# Patient Record
Sex: MALE | Race: WHITE | Employment: OTHER | URBAN - METROPOLITAN AREA
[De-identification: names, ages, dates, MRNs, and addresses within clinical notes are randomized per-mention and may not be internally consistent; named-entity substitution may affect disease eponyms.]

---

## 2020-03-30 ENCOUNTER — HOSPITAL ENCOUNTER (EMERGENCY)
Age: 75
Discharge: HOME | End: 2020-03-30
Attending: EMERGENCY MEDICINE
Payer: MEDICARE

## 2020-03-30 ENCOUNTER — APPOINTMENT (OUTPATIENT)
Dept: CT IMAGING | Age: 75
End: 2020-03-30
Payer: MEDICARE

## 2020-03-30 ENCOUNTER — APPOINTMENT (OUTPATIENT)
Dept: GENERAL RADIOLOGY | Age: 75
End: 2020-03-30
Payer: MEDICARE

## 2020-03-30 VITALS
TEMPERATURE: 97.3 F | HEART RATE: 88 BPM | HEIGHT: 72 IN | OXYGEN SATURATION: 97 % | SYSTOLIC BLOOD PRESSURE: 161 MMHG | RESPIRATION RATE: 15 BRPM | WEIGHT: 210 LBS | DIASTOLIC BLOOD PRESSURE: 80 MMHG | BODY MASS INDEX: 28.44 KG/M2

## 2020-03-30 LAB
ALBUMIN SERPL-MCNC: 4.1 GM/DL (ref 3.4–5)
ALP BLD-CCNC: 70 IU/L (ref 40–129)
ALT SERPL-CCNC: 13 U/L (ref 10–40)
ANION GAP SERPL CALCULATED.3IONS-SCNC: 17 MMOL/L (ref 4–16)
AST SERPL-CCNC: 20 IU/L (ref 15–37)
BASOPHILS ABSOLUTE: 0 K/CU MM
BASOPHILS RELATIVE PERCENT: 0.4 % (ref 0–1)
BILIRUB SERPL-MCNC: 0.4 MG/DL (ref 0–1)
BUN BLDV-MCNC: 21 MG/DL (ref 6–23)
CALCIUM SERPL-MCNC: 9.9 MG/DL (ref 8.3–10.6)
CHLORIDE BLD-SCNC: 103 MMOL/L (ref 99–110)
CO2: 21 MMOL/L (ref 21–32)
CREAT SERPL-MCNC: 1.5 MG/DL (ref 0.9–1.3)
DIFFERENTIAL TYPE: ABNORMAL
EOSINOPHILS ABSOLUTE: 0.3 K/CU MM
EOSINOPHILS RELATIVE PERCENT: 2.8 % (ref 0–3)
GFR AFRICAN AMERICAN: 56 ML/MIN/1.73M2
GFR NON-AFRICAN AMERICAN: 46 ML/MIN/1.73M2
GLUCOSE BLD-MCNC: 116 MG/DL (ref 70–99)
HCT VFR BLD CALC: 46.9 % (ref 42–52)
HEMOGLOBIN: 15.8 GM/DL (ref 13.5–18)
IMMATURE NEUTROPHIL %: 0.1 % (ref 0–0.43)
LYMPHOCYTES ABSOLUTE: 3.8 K/CU MM
LYMPHOCYTES RELATIVE PERCENT: 41.4 % (ref 24–44)
MCH RBC QN AUTO: 31.3 PG (ref 27–31)
MCHC RBC AUTO-ENTMCNC: 33.7 % (ref 32–36)
MCV RBC AUTO: 93.1 FL (ref 78–100)
MONOCYTES ABSOLUTE: 0.9 K/CU MM
MONOCYTES RELATIVE PERCENT: 9.4 % (ref 0–4)
PDW BLD-RTO: 13.2 % (ref 11.7–14.9)
PLATELET # BLD: 227 K/CU MM (ref 140–440)
PMV BLD AUTO: 10.8 FL (ref 7.5–11.1)
POTASSIUM SERPL-SCNC: 4.4 MMOL/L (ref 3.5–5.1)
RBC # BLD: 5.04 M/CU MM (ref 4.6–6.2)
SEGMENTED NEUTROPHILS ABSOLUTE COUNT: 4.2 K/CU MM
SEGMENTED NEUTROPHILS RELATIVE PERCENT: 45.9 % (ref 36–66)
SODIUM BLD-SCNC: 141 MMOL/L (ref 135–145)
TOTAL IMMATURE NEUTOROPHIL: 0.01 K/CU MM
TOTAL PROTEIN: 7.2 GM/DL (ref 6.4–8.2)
TROPONIN T: <0.01 NG/ML
WBC # BLD: 9.1 K/CU MM (ref 4–10.5)

## 2020-03-30 PROCEDURE — 93005 ELECTROCARDIOGRAM TRACING: CPT | Performed by: EMERGENCY MEDICINE

## 2020-03-30 PROCEDURE — 80053 COMPREHEN METABOLIC PANEL: CPT

## 2020-03-30 PROCEDURE — 99284 EMERGENCY DEPT VISIT MOD MDM: CPT

## 2020-03-30 PROCEDURE — 84484 ASSAY OF TROPONIN QUANT: CPT

## 2020-03-30 PROCEDURE — 85025 COMPLETE CBC W/AUTO DIFF WBC: CPT

## 2020-03-30 PROCEDURE — 70450 CT HEAD/BRAIN W/O DYE: CPT

## 2020-03-30 PROCEDURE — 6370000000 HC RX 637 (ALT 250 FOR IP): Performed by: EMERGENCY MEDICINE

## 2020-03-30 PROCEDURE — 71046 X-RAY EXAM CHEST 2 VIEWS: CPT

## 2020-03-30 RX ORDER — MECLIZINE HCL 12.5 MG/1
50 TABLET ORAL ONCE
Status: COMPLETED | OUTPATIENT
Start: 2020-03-30 | End: 2020-03-30

## 2020-03-30 RX ORDER — MECLIZINE HYDROCHLORIDE 25 MG/1
25 TABLET ORAL 3 TIMES DAILY PRN
Qty: 15 TABLET | Refills: 0 | Status: SHIPPED | OUTPATIENT
Start: 2020-03-30 | End: 2020-04-09

## 2020-03-30 RX ORDER — ONDANSETRON 4 MG/1
4 TABLET, FILM COATED ORAL 3 TIMES DAILY PRN
Qty: 15 TABLET | Refills: 0 | Status: SHIPPED | OUTPATIENT
Start: 2020-03-30 | End: 2022-02-17

## 2020-03-30 RX ADMIN — MECLIZINE 50 MG: 12.5 TABLET ORAL at 16:00

## 2020-03-30 NOTE — ED PROVIDER NOTES
Triage Chief Complaint:   Dizziness      [place]:  Marcy Pedraza is a 70 y.o. male that presents for:    Chief complaint:  Dizzy    Location:  Generalized    Quality/Characteristic:  Feels light headed at times but also feels some rotational vertiginous component as well. Duration:  Intermittent over the past week, not worse today however he was unable to get in touch with his PCP, he believes the office is closed because of the pandemic. Aggravating/Alleviating factors:  He is specifically wondering what his glucose is and if he is diabetic, this was a concern of a friend/family member and so he came in for labs. No prior episodes. No new meds. No head trauma. No prior brain imaging. Associated symptoms:  Denies fever, visual changes, neck pain/stiffness, tinnitus, hearing loss, sore throat, congestion, chest pain, sob, pnd, orthopnea, abdominal pain, vomiting, diarrhea, urinary complaints. Denies adamantly no headache, difficulty with speech, visual changes, cognitive difficulty, weakness, numbness, tingling. No gait disturbance leading to fall or imbalance. Severity:  No pain, feels symptoms \"aren't that bad so I didn't want to come in I just wanted to make sure it wasn't diabetes. \"    ROS:    Constitutional: No fevers/chills. No weight changes. No night sweats. Eyes:  No blurry vision or double vision. No drainage. Ears, Nose, Throat:  No hearing changes. No rhinitis. No sore throat or cough. Cardiac: No chest pain or pressure. No arm/jaw pain. No palpitations but does endorse lightheadedness. No claudication symptoms. Respiratory:  No dyspnea. No hemoptysis. GI: No abdominal pain. No n/v/d. No hematochezia or melena. :  No hematuria. No dysuria. No discharge. MSK:  No joint pain or swelling. No lower extremity swelling. Skin:  No rashes. No pruritis. Neuro:  No numbness, tingling or weakness in any extremity or face. No headache.  +dizzy as above.   No 0=No drift, limb holds 90 (or 45) degrees for full 10 seconds  7. Limb Ataxia:    0=Absent  8. Sensory:          0=Normal; no sensory loss  9. Best Language:           0=No aphasia, normal  10. Dysarthria:      0=Normal  11. Extinction and Inattention:      0=No abnormality  12.        Distal motor function:  0=Normal              Total:  0      I have reviewed and interpreted all of the currently available lab results from this visit (if applicable):  Results for orders placed or performed during the hospital encounter of 03/30/20   CBC Auto Differential   Result Value Ref Range    WBC 9.1 4.0 - 10.5 K/CU MM    RBC 5.04 4.6 - 6.2 M/CU MM    Hemoglobin 15.8 13.5 - 18.0 GM/DL    Hematocrit 46.9 42 - 52 %    MCV 93.1 78 - 100 FL    MCH 31.3 (H) 27 - 31 PG    MCHC 33.7 32.0 - 36.0 %    RDW 13.2 11.7 - 14.9 %    Platelets 642 713 - 871 K/CU MM    MPV 10.8 7.5 - 11.1 FL    Differential Type AUTOMATED DIFFERENTIAL     Segs Relative 45.9 36 - 66 %    Lymphocytes % 41.4 24 - 44 %    Monocytes % 9.4 (H) 0 - 4 %    Eosinophils % 2.8 0 - 3 %    Basophils % 0.4 0 - 1 %    Segs Absolute 4.2 K/CU MM    Lymphocytes Absolute 3.8 K/CU MM    Monocytes Absolute 0.9 K/CU MM    Eosinophils Absolute 0.3 K/CU MM    Basophils Absolute 0.0 K/CU MM    Immature Neutrophil % 0.1 0 - 0.43 %    Total Immature Neutrophil 0.01 K/CU MM   Comprehensive Metabolic Panel w/ Reflex to MG   Result Value Ref Range    Sodium 141 135 - 145 MMOL/L    Potassium 4.4 3.5 - 5.1 MMOL/L    Chloride 103 99 - 110 mMol/L    CO2 21 21 - 32 MMOL/L    BUN 21 6 - 23 MG/DL    CREATININE 1.5 (H) 0.9 - 1.3 MG/DL    Glucose 116 (H) 70 - 99 MG/DL    Calcium 9.9 8.3 - 10.6 MG/DL    Alb 4.1 3.4 - 5.0 GM/DL    Total Protein 7.2 6.4 - 8.2 GM/DL    Total Bilirubin 0.4 0.0 - 1.0 MG/DL    ALT 13 10 - 40 U/L    AST 20 15 - 37 IU/L    Alkaline Phosphatase 70 40 - 129 IU/L    GFR Non- 46 (L) >60 mL/min/1.73m2    GFR  56 cause of his symptoms which if left undiagnosed could lead to serious disability or death. Discussion included but not limited to heart attack, stroke, blood clots and kidney failure. After multiple discussions the patient refused. He prefers to follow up on an outpatient basis and does understand that he will forego emergent workup via admission for further imaging such as MRI, ultrasounds, angiography. The patient consulted with his friend, a nurse in our health system, and does feel that he will refuse. Given these constraints I have counseled him to return to ER if worse in any way. Final Impression:  1. Dizziness        Discharge referral (if applicable): MD Kimi [address]  [phone]    Schedule an appointment as soon as possible for a visit   Schedule an appointment to begin primary care if you do not have a primary care physician, this is the on-call primary doctor of the day. Alfreda Santos MD  [phone]    Schedule an appointment as soon as possible for a visit   Call to schedule an appointment with neurology for further evaluation of dizziness, and to discuss outpatient work-up including  MRI of the brain.       Discharge medications (if applicable):  Discharge Medication List as of 3/30/2020  5:12 PM      START taking these medications    Details   ondansetron (ZOFRAN) 4 MG tablet Take 1 tablet by mouth 3 times daily as needed for Nausea (dizziness), Disp-15 tablet, R-0Print      meclizine (ANTIVERT) 25 MG tablet Take 1 tablet by mouth 3 times daily as needed for Dizziness or Nausea, Disp-15 tablet, R-0Print             (Please note that portions of this note may have been completed with a voice recognition program. Efforts were made to edit the dictations but occasionally words are mis-transcribed.)    Ruben Lara MD          CRITICAL CARE TIME: 41 minutes of critical care time was provided to

## 2020-03-30 NOTE — ED TRIAGE NOTES
Pt reports having dizziness on for a week or so pt thought he was eating too much sugar and cut back states he is trying to get into the dr but unable to get an appointment.  Pt is alert and oriented and in no distress

## 2020-03-31 PROCEDURE — 93010 ELECTROCARDIOGRAM REPORT: CPT | Performed by: INTERNAL MEDICINE

## 2020-04-01 LAB
EKG ATRIAL RATE: 77 BPM
EKG DIAGNOSIS: NORMAL
EKG P AXIS: 36 DEGREES
EKG P-R INTERVAL: 116 MS
EKG Q-T INTERVAL: 374 MS
EKG QRS DURATION: 80 MS
EKG QTC CALCULATION (BAZETT): 423 MS
EKG R AXIS: 5 DEGREES
EKG T AXIS: 35 DEGREES
EKG VENTRICULAR RATE: 77 BPM

## 2020-09-18 ENCOUNTER — HOSPITAL ENCOUNTER (OUTPATIENT)
Dept: MRI IMAGING | Age: 75
Discharge: HOME | End: 2020-09-18
Payer: MEDICARE

## 2020-09-18 PROCEDURE — 70551 MRI BRAIN STEM W/O DYE: CPT

## 2021-10-25 ENCOUNTER — OFFICE VISIT (OUTPATIENT)
Dept: NEUROLOGY | Age: 76
End: 2021-10-25
Payer: MEDICARE

## 2021-10-25 DIAGNOSIS — G62.9 NEUROPATHY: Primary | ICD-10-CM

## 2021-10-25 PROCEDURE — 95910 NRV CNDJ TEST 7-8 STUDIES: CPT | Performed by: PHYSICAL MEDICINE & REHABILITATION

## 2021-10-25 PROCEDURE — 95886 MUSC TEST DONE W/N TEST COMP: CPT | Performed by: PHYSICAL MEDICINE & REHABILITATION

## 2021-10-25 NOTE — PROGRESS NOTES
EMG    Risks and benefits of study discussed. Specific and common risks of pain and bleeding as well as uncommon side effects of infection, hematoma and vasovagal episodes    Patient agreeable to testing and consents to such. Clinical: 6 months of ataxia and some paresthesias of the feet    Motor NCS:  Tibial amplitudes are mildly depressed with normal latencies and mildly slow velocity. Peroneal amplitudes are mild to moderately depressed; latency on the right is normal left is mildly slow; velocities are mildly slow bilateral    Sensory NCS:  Sural and peroneal responses are absent bilateral    Needle EMG:  Mild enlargement in the distal ankle segments    Impression:  #1 mild, chronic, axonal peripheral neuropathy, length dependent affecting the lower limbs. # 2 no convincing evidence of a focal or proximal lesion such as radiculopathy, plexopathy or mononeuropathy.

## 2022-02-17 ENCOUNTER — HOSPITAL ENCOUNTER (OUTPATIENT)
Age: 77
Discharge: HOME | End: 2022-02-17
Payer: MEDICARE

## 2022-02-17 ENCOUNTER — HOSPITAL ENCOUNTER (OUTPATIENT)
Dept: GENERAL RADIOLOGY | Age: 77
Discharge: HOME | End: 2022-02-17
Payer: MEDICARE

## 2022-02-17 DIAGNOSIS — Z01.818 ENCOUNTER FOR PREADMISSION TESTING: ICD-10-CM

## 2022-02-17 PROCEDURE — 71046 X-RAY EXAM CHEST 2 VIEWS: CPT

## 2022-02-17 NOTE — PROGRESS NOTES
2/17/22 1149 2/17/22 Patient is very confused on where his surgery is. I explained to him that surgery is at [facility] on 2/21/22. He told me someone called him and told him that his surgery is at the surgery center. He was upset and yelling  and told me to call his wife Bandar Cuevas at [phone].      2/17/22 1154  I spoke to Bandar Faith and she completed the PAT assessment for the patient and  took the pre-op instructions. [place] Faith was informed that Shanae Vaqsuez has to have a CXR done at [facility] either today, 2/17/22 or 2/18/22. Shanae Vasquez is to arrive anytime before 1400 to complete CXR. Bandar Cuevas told me that patient had his covid test done at the [facility]. 2/17/22 1233 I spoke to Niltonadonay  at [facility] and she confirmed that  The patient did have a covid test done on 2/14/22 and it is negative. She is faxing the results to me. ADDENDUM 2/17/22 1335 Received Covid test results. 2/17/22 1241 I called Dr. Solomon harper and was transferred to the voicemail of surgery scheduler Alonzo Contreras. I informed her that when I called the patient he was upset and confused because he said that someone called him and told him his surgery was at the surgery center. He is referring to [facility] surgery center. Patient got his Covid test done there. He refused to talk to me and told me to call his wife. I ask that Alonzo Contreras please reach out to [facility] to let them know that Steve's surgery is here at [facility] on 2/21/22.    2/17/22 1247  I spoke to Bandar Cuevas and let [facility] know [facility] is faxing me a copy ogf the negative covid test results from 2/14/22. Also, I let her know that I called Dr. Solomon harper and left a voicemail for Alonzo Contreras, the surgery schedule to call Bandar Cuevas and Shanae Vasquez to resolve any confusion. Bandar Cuevas thanked me for everything and said Shanae Vasquez is on his way to [facility] to have the CXR done.

## 2022-02-17 NOTE — PROGRESS NOTES
2/17/22 1044 (cell) No voicemail box set up, so unable to leave a voicemail. 2/17/22 1048 (home phone) [facility] answered the phone and said this phone has a bad connection. She said she will have Beto Villegas call me back on his cell phone. 2/17/22 1149 I spoke to patient and he is confused about location and Covid test. He told me to call his wife [facility] again and talk to her.    2/17/22 1154 I spoke to [facility] and she completed the phone PAT assessment for Beto Villegas. She took the pre-op instructions. She was informed that Beto Villegas must have a CXR done either today or Friday, 2/18/22 done at [address] [place] [address]. She confirmed that Beto Villegas did a Covid test at [facility]. 2/17/22 1234 I spoke to Betsygen 91 at [facility] and she confirmed patient had a covid test on 2/14/22 and it is negative. She is faxing me the results. Surgery is at [address] [place] [address] on 2/21/22. You will be called on 2/18/22  with times. 1. Do not eat or drink anything after midnight - unless instructed by your doctor prior to surgery. This includes no water, chewing gum or mints. 2. Follow your directions as prescribed by the doctor for your procedure and medications. 3. Check with your Doctor regarding stopping vitamins, supplements, blood thinners (Plavix, Coumadin, Lovenox, Effient, Pradaxa, Xarelto, Fragmin or other blood thinners) and follow their instructions. Stop all supplements and herbals 7 days prior to surgery. Morning of surgery, do not take any medications. 4. Do not smoke, and do not drink any alcoholic beverages 24 hours prior to surgery. This includes NA Beer. 5. You may brush your teeth and gargle the morning of surgery. DO NOT SWALLOW WATER   6. You MUST make arrangements for a responsible adult to take you home after your surgery and be able to check on you every couple hours for the day. You will not be allowed to leave alone or drive yourself home. It is strongly suggested someone stay with you the first 24  hrs.  Your surgery will be cancelled if you do not have a ride home. 7. Please wear simple, loose fitting clothing to the hospital.  Gina Jacques not bring valuables (money, credit cards, checkbooks, etc.) Do not wear any makeup (including no eye makeup) or nail polish on your fingers or toes. 8. DO NOT wear any jewelry or piercings on day of surgery. All body piercing jewelry must be removed. 9. If you have dentures, they will be removed before going to the OR; we will provide you a container. If you wear contact lenses or glasses,  they will be removed; please bring a case for them. 10. If you  have a Living Will and Durable Power of  for Healthcare, please bring in a copy. 11. Please bring picture ID,  insurance card, paperwork from the doctors office    (H & P, Consent, & card for implantable devices). 12. Take a shower the night before or morning of your procedure, do not apply any lotion, oil or powder. Use the provided CHG wash during pre-op shower/bath. 13. Wear a mask covering your nose & mouth when entering the hospital. Have your covid-19 test performed within 2-7 days of your surgery. Quarantine yourself after the test until after your surgery. Murali 32 2/14/22 = negative.

## 2022-02-18 NOTE — PROGRESS NOTES
I confirmed with patient's wife, Svetlana Hills  that surgery is at [facility] on 2/21/22 at [address] with an arrival time of 1015.

## 2022-02-21 ENCOUNTER — HOSPITAL ENCOUNTER (OUTPATIENT)
Age: 77
Setting detail: OUTPATIENT SURGERY
Discharge: HOME | End: 2022-02-21
Attending: UROLOGY | Admitting: UROLOGY
Payer: MEDICARE

## 2022-02-21 ENCOUNTER — ANESTHESIA (OUTPATIENT)
Dept: OPERATING ROOM | Age: 77
End: 2022-02-21
Payer: MEDICARE

## 2022-02-21 ENCOUNTER — ANESTHESIA EVENT (OUTPATIENT)
Dept: OPERATING ROOM | Age: 77
End: 2022-02-21
Payer: MEDICARE

## 2022-02-21 VITALS
SYSTOLIC BLOOD PRESSURE: 130 MMHG | OXYGEN SATURATION: 95 % | TEMPERATURE: 97.2 F | HEART RATE: 70 BPM | RESPIRATION RATE: 18 BRPM | BODY MASS INDEX: 25.73 KG/M2 | WEIGHT: 190 LBS | HEIGHT: 72 IN | DIASTOLIC BLOOD PRESSURE: 65 MMHG

## 2022-02-21 VITALS
TEMPERATURE: 98.6 F | DIASTOLIC BLOOD PRESSURE: 69 MMHG | RESPIRATION RATE: 10 BRPM | OXYGEN SATURATION: 95 % | SYSTOLIC BLOOD PRESSURE: 115 MMHG

## 2022-02-21 DIAGNOSIS — Z01.818 ENCOUNTER FOR PREADMISSION TESTING: Primary | ICD-10-CM

## 2022-02-21 DIAGNOSIS — N40.1 BENIGN PROSTATIC HYPERPLASIA WITH LOWER URINARY TRACT SYMPTOMS, SYMPTOM DETAILS UNSPECIFIED: ICD-10-CM

## 2022-02-21 DIAGNOSIS — N13.8 ENLARGED PROSTATE WITH URINARY OBSTRUCTION: ICD-10-CM

## 2022-02-21 DIAGNOSIS — N40.1 ENLARGED PROSTATE WITH URINARY OBSTRUCTION: ICD-10-CM

## 2022-02-21 LAB
ANION GAP SERPL CALCULATED.3IONS-SCNC: 9 MMOL/L (ref 4–16)
BUN BLDV-MCNC: 20 MG/DL (ref 6–23)
CALCIUM SERPL-MCNC: 11.1 MG/DL (ref 8.3–10.6)
CHLORIDE BLD-SCNC: 102 MMOL/L (ref 99–110)
CO2: 26 MMOL/L (ref 21–32)
CREAT SERPL-MCNC: 2.3 MG/DL (ref 0.9–1.3)
GFR AFRICAN AMERICAN: 34 ML/MIN/1.73M2
GFR NON-AFRICAN AMERICAN: 28 ML/MIN/1.73M2
GLUCOSE BLD-MCNC: 121 MG/DL (ref 70–99)
POTASSIUM SERPL-SCNC: 4.4 MMOL/L (ref 3.5–5.1)
SODIUM BLD-SCNC: 137 MMOL/L (ref 135–145)

## 2022-02-21 PROCEDURE — 7100000010 HC PHASE II RECOVERY - FIRST 15 MIN: Performed by: UROLOGY

## 2022-02-21 PROCEDURE — 2580000003 HC RX 258: Performed by: UROLOGY

## 2022-02-21 PROCEDURE — 7100000011 HC PHASE II RECOVERY - ADDTL 15 MIN: Performed by: UROLOGY

## 2022-02-21 PROCEDURE — 6360000002 HC RX W HCPCS: Performed by: UROLOGY

## 2022-02-21 PROCEDURE — 7100000001 HC PACU RECOVERY - ADDTL 15 MIN: Performed by: UROLOGY

## 2022-02-21 PROCEDURE — 88305 TISSUE EXAM BY PATHOLOGIST: CPT

## 2022-02-21 PROCEDURE — 3700000000 HC ANESTHESIA ATTENDED CARE: Performed by: UROLOGY

## 2022-02-21 PROCEDURE — 2500000003 HC RX 250 WO HCPCS: Performed by: NURSE ANESTHETIST, CERTIFIED REGISTERED

## 2022-02-21 PROCEDURE — 6360000002 HC RX W HCPCS: Performed by: NURSE ANESTHETIST, CERTIFIED REGISTERED

## 2022-02-21 PROCEDURE — 3600000013 HC SURGERY LEVEL 3 ADDTL 15MIN: Performed by: UROLOGY

## 2022-02-21 PROCEDURE — 2709999900 HC NON-CHARGEABLE SUPPLY: Performed by: UROLOGY

## 2022-02-21 PROCEDURE — 3600000003 HC SURGERY LEVEL 3 BASE: Performed by: UROLOGY

## 2022-02-21 PROCEDURE — 3700000001 HC ADD 15 MINUTES (ANESTHESIA): Performed by: UROLOGY

## 2022-02-21 PROCEDURE — 80048 BASIC METABOLIC PNL TOTAL CA: CPT

## 2022-02-21 PROCEDURE — 7100000000 HC PACU RECOVERY - FIRST 15 MIN: Performed by: UROLOGY

## 2022-02-21 RX ORDER — SODIUM CHLORIDE 0.9 % (FLUSH) 0.9 %
5-40 SYRINGE (ML) INJECTION EVERY 12 HOURS SCHEDULED
Status: DISCONTINUED | OUTPATIENT
Start: 2022-02-21 | End: 2022-02-21 | Stop reason: HOSPADM

## 2022-02-21 RX ORDER — SODIUM CHLORIDE, SODIUM LACTATE, POTASSIUM CHLORIDE, CALCIUM CHLORIDE 600; 310; 30; 20 MG/100ML; MG/100ML; MG/100ML; MG/100ML
INJECTION, SOLUTION INTRAVENOUS ONCE
Status: COMPLETED | OUTPATIENT
Start: 2022-02-21 | End: 2022-02-21

## 2022-02-21 RX ORDER — DEXAMETHASONE SODIUM PHOSPHATE 4 MG/ML
INJECTION, SOLUTION INTRA-ARTICULAR; INTRALESIONAL; INTRAMUSCULAR; INTRAVENOUS; SOFT TISSUE PRN
Status: DISCONTINUED | OUTPATIENT
Start: 2022-02-21 | End: 2022-02-21 | Stop reason: SDUPTHER

## 2022-02-21 RX ORDER — METOCLOPRAMIDE HYDROCHLORIDE 5 MG/ML
10 INJECTION INTRAMUSCULAR; INTRAVENOUS
Status: DISCONTINUED | OUTPATIENT
Start: 2022-02-21 | End: 2022-02-21 | Stop reason: HOSPADM

## 2022-02-21 RX ORDER — SODIUM CHLORIDE 0.9 % (FLUSH) 0.9 %
5-40 SYRINGE (ML) INJECTION PRN
Status: DISCONTINUED | OUTPATIENT
Start: 2022-02-21 | End: 2022-02-21 | Stop reason: HOSPADM

## 2022-02-21 RX ORDER — FENTANYL CITRATE 50 UG/ML
INJECTION, SOLUTION INTRAMUSCULAR; INTRAVENOUS PRN
Status: DISCONTINUED | OUTPATIENT
Start: 2022-02-21 | End: 2022-02-21 | Stop reason: SDUPTHER

## 2022-02-21 RX ORDER — HYDROCODONE BITARTRATE AND ACETAMINOPHEN 5; 325 MG/1; MG/1
1 TABLET ORAL EVERY 8 HOURS PRN
Qty: 15 TABLET | Refills: 0 | Status: SHIPPED | OUTPATIENT
Start: 2022-02-21 | End: 2022-02-26

## 2022-02-21 RX ORDER — HYDROMORPHONE HCL 110MG/55ML
0.5 PATIENT CONTROLLED ANALGESIA SYRINGE INTRAVENOUS EVERY 5 MIN PRN
Status: DISCONTINUED | OUTPATIENT
Start: 2022-02-21 | End: 2022-02-21 | Stop reason: HOSPADM

## 2022-02-21 RX ORDER — SODIUM CHLORIDE 9 MG/ML
25 INJECTION, SOLUTION INTRAVENOUS PRN
Status: DISCONTINUED | OUTPATIENT
Start: 2022-02-21 | End: 2022-02-21 | Stop reason: HOSPADM

## 2022-02-21 RX ORDER — SULFAMETHOXAZOLE AND TRIMETHOPRIM 400; 80 MG/1; MG/1
1 TABLET ORAL 2 TIMES DAILY
COMMUNITY
End: 2022-08-15

## 2022-02-21 RX ORDER — LIDOCAINE HYDROCHLORIDE 20 MG/ML
INJECTION, SOLUTION EPIDURAL; INFILTRATION; INTRACAUDAL; PERINEURAL PRN
Status: DISCONTINUED | OUTPATIENT
Start: 2022-02-21 | End: 2022-02-21 | Stop reason: SDUPTHER

## 2022-02-21 RX ORDER — CIPROFLOXACIN 2 MG/ML
INJECTION, SOLUTION INTRAVENOUS PRN
Status: DISCONTINUED | OUTPATIENT
Start: 2022-02-21 | End: 2022-02-21 | Stop reason: SDUPTHER

## 2022-02-21 RX ORDER — ONDANSETRON 2 MG/ML
4 INJECTION INTRAMUSCULAR; INTRAVENOUS
Status: DISCONTINUED | OUTPATIENT
Start: 2022-02-21 | End: 2022-02-21 | Stop reason: HOSPADM

## 2022-02-21 RX ORDER — FENTANYL CITRATE 50 UG/ML
25 INJECTION, SOLUTION INTRAMUSCULAR; INTRAVENOUS EVERY 5 MIN PRN
Status: DISCONTINUED | OUTPATIENT
Start: 2022-02-21 | End: 2022-02-21 | Stop reason: HOSPADM

## 2022-02-21 RX ORDER — CIPROFLOXACIN 2 MG/ML
400 INJECTION, SOLUTION INTRAVENOUS ONCE
Status: CANCELLED | OUTPATIENT
Start: 2022-02-21

## 2022-02-21 RX ORDER — ONDANSETRON 2 MG/ML
INJECTION INTRAMUSCULAR; INTRAVENOUS PRN
Status: DISCONTINUED | OUTPATIENT
Start: 2022-02-21 | End: 2022-02-21 | Stop reason: SDUPTHER

## 2022-02-21 RX ORDER — PHENYLEPHRINE HCL IN 0.9% NACL 1 MG/10 ML
SYRINGE (ML) INTRAVENOUS PRN
Status: DISCONTINUED | OUTPATIENT
Start: 2022-02-21 | End: 2022-02-21 | Stop reason: SDUPTHER

## 2022-02-21 RX ORDER — ONDANSETRON 2 MG/ML
INJECTION INTRAMUSCULAR; INTRAVENOUS PRN
Status: DISCONTINUED | OUTPATIENT
Start: 2022-02-21 | End: 2022-02-21

## 2022-02-21 RX ORDER — CIPROFLOXACIN 2 MG/ML
400 INJECTION, SOLUTION INTRAVENOUS EVERY 12 HOURS
Status: CANCELLED | OUTPATIENT
Start: 2022-02-21

## 2022-02-21 RX ORDER — PROPOFOL 10 MG/ML
INJECTION, EMULSION INTRAVENOUS PRN
Status: DISCONTINUED | OUTPATIENT
Start: 2022-02-21 | End: 2022-02-21 | Stop reason: SDUPTHER

## 2022-02-21 RX ADMIN — PROPOFOL 130 MG: 10 INJECTION, EMULSION INTRAVENOUS at 13:55

## 2022-02-21 RX ADMIN — DEXAMETHASONE SODIUM PHOSPHATE 8 MG: 4 INJECTION, SOLUTION INTRAMUSCULAR; INTRAVENOUS at 14:01

## 2022-02-21 RX ADMIN — CIPROFLOXACIN 400 MG: 2 INJECTION, SOLUTION INTRAVENOUS at 14:02

## 2022-02-21 RX ADMIN — SODIUM CHLORIDE, POTASSIUM CHLORIDE, SODIUM LACTATE AND CALCIUM CHLORIDE: 600; 310; 30; 20 INJECTION, SOLUTION INTRAVENOUS at 11:07

## 2022-02-21 RX ADMIN — LIDOCAINE HYDROCHLORIDE 100 MG: 20 INJECTION, SOLUTION EPIDURAL; INFILTRATION; INTRACAUDAL at 13:55

## 2022-02-21 RX ADMIN — Medication 0.1 MG: at 14:33

## 2022-02-21 RX ADMIN — FENTANYL CITRATE 50 MCG: 50 INJECTION, SOLUTION INTRAMUSCULAR; INTRAVENOUS at 14:04

## 2022-02-21 RX ADMIN — CEFAZOLIN 2000 MG: 10 INJECTION, POWDER, FOR SOLUTION INTRAVENOUS at 13:58

## 2022-02-21 RX ADMIN — ONDANSETRON 4 MG: 2 INJECTION INTRAMUSCULAR; INTRAVENOUS at 14:02

## 2022-02-21 RX ADMIN — FENTANYL CITRATE 50 MCG: 50 INJECTION, SOLUTION INTRAMUSCULAR; INTRAVENOUS at 13:55

## 2022-02-21 ASSESSMENT — PULMONARY FUNCTION TESTS
PIF_VALUE: 11
PIF_VALUE: 1
PIF_VALUE: 11
PIF_VALUE: 11
PIF_VALUE: 9
PIF_VALUE: 14
PIF_VALUE: 11
PIF_VALUE: 2
PIF_VALUE: 1
PIF_VALUE: 10
PIF_VALUE: 11
PIF_VALUE: 1
PIF_VALUE: 14
PIF_VALUE: 10
PIF_VALUE: 11
PIF_VALUE: 3
PIF_VALUE: 11
PIF_VALUE: 1
PIF_VALUE: 11
PIF_VALUE: 12
PIF_VALUE: 9
PIF_VALUE: 11
PIF_VALUE: 13
PIF_VALUE: 12
PIF_VALUE: 17
PIF_VALUE: 11
PIF_VALUE: 9
PIF_VALUE: 11
PIF_VALUE: 10
PIF_VALUE: 12
PIF_VALUE: 11
PIF_VALUE: 11
PIF_VALUE: 4
PIF_VALUE: 2
PIF_VALUE: 11
PIF_VALUE: 11
PIF_VALUE: 6
PIF_VALUE: 3
PIF_VALUE: 10
PIF_VALUE: 10
PIF_VALUE: 12
PIF_VALUE: 1
PIF_VALUE: 12
PIF_VALUE: 11
PIF_VALUE: 11
PIF_VALUE: 12
PIF_VALUE: 11
PIF_VALUE: 10
PIF_VALUE: 11
PIF_VALUE: 10
PIF_VALUE: 10
PIF_VALUE: 2
PIF_VALUE: 4
PIF_VALUE: 13
PIF_VALUE: 17
PIF_VALUE: 0
PIF_VALUE: 11
PIF_VALUE: 18
PIF_VALUE: 11
PIF_VALUE: 3
PIF_VALUE: 12
PIF_VALUE: 11
PIF_VALUE: 10
PIF_VALUE: 11
PIF_VALUE: 4

## 2022-02-21 ASSESSMENT — PAIN SCALES - GENERAL
PAINLEVEL_OUTOF10: 0

## 2022-02-21 NOTE — PROGRESS NOTES
[phone] Arrived to [facility], monitors placed and alarms on. Report received from 6 [facility] Rn/Jonathan WALKER. 1520 Dr. Pedrito Greene at bedside  1530 Stat BMP drawn per order and sent to lab. Tolerating ice chips denies any nausea or pain. 1615 Transported to [facility] room 22. Report given to Via Charlie Ward at bedside.

## 2022-02-21 NOTE — H&P
Department of Urology   H&P  [facility] 1 2 3 4 5      Date: 2022   Patient: Pat Khan   : 1945   DOA: 2022   MRN: [id number]   ROOM#: OR/NONE     CHIEF COMPLAINT:  BPH with LUTS                                       Urinary Retention                                       Bladder dysfunction    History Obtained From:  patient    HISTORY OF PRESENT ILLNESS:                The patient is a 78 y.o. male with significant past medical history of chronic BPH with LUTS and current urinary retention with loyola catheter in place. Failed medical therapy. Failed voiding trials. IPSS score > 20. Loyola in place with clear yellow urine. On Bactrim for pre-op urine PCR. On Flomax and Proscar. I reviewed risks, benefits, and alternative therapies. He elected to proceed with TURP. We discussed side affects and failure rates. He declined a SP tube at this time. Discussed expected post op course and loyola replacement. History of elevated PSA with MRI Prostate 2021- no suspicious prostate lesions. Past Medical History:    History reviewed. No pertinent past medical history. Past Surgical History:        Procedure Laterality Date    APPENDECTOMY      HERNIA REPAIR       Current Medications:   proscar  Bactrim  flomax      Allergies:  Patient has no known allergies. Social History:   TOBACCO:   reports that he has never smoked. He has never used smokeless tobacco.  ETOH:   reports previous alcohol use. DRUGS:   reports no history of drug use. MARITAL STATUS:    OCCUPATION:      Family History:         Problem Relation Age of Onset    No Known Problems Mother     No Known Problems Father        REVIEW OF SYSTEMS:    PHYSICAL EXAM:    VITALS:  BP (!) 144/75   Pulse 89   Temp 97.4 °F (36.3 °C) (Temporal)   Resp 16   Ht 6' (1.829 m)   Wt 190 lb (86.2 kg)   SpO2 95%   BMI 25.77 kg/m²     TEMPERATURE:  Current - Temp: 97.4 °F (36.3 °C);  Max - Temp  Av.4 °F (36.3 °C)  Min: 97.4 °F (36.3 °C)  Max: 97.4 °F (36.3 °C)  24HR BLOOD PRESSURE RANGE:  Systolic (23XAO), CFP:785 , Min:144 , VCW:021   ; Diastolic (97FTD), HPR:10, Min:75, Max:75    8HR INTAKE OUTPUT:  No intake/output data recorded. URINARY CATHETER OUTPUT (Loyola):     DRAIN/TUBE OUTPUT:        CONSTITUTIONAL:  awake, alert, cooperative, no apparent distress, and appears stated age  CTA B  RRR  Soft, nd/nt, benign  circ phallus  Loyola in place with clear yellow urine    Data:    WBC:    Lab Results   Component Value Date    WBC 9.1 03/30/2020     Hemoglobin/Hematocrit:    Lab Results   Component Value Date    HGB 15.8 03/30/2020    HCT 46.9 03/30/2020     BMP:    Lab Results   Component Value Date     03/30/2020    K 4.4 03/30/2020     03/30/2020    CO2 21 03/30/2020    BUN 21 03/30/2020    LABALBU 4.1 03/30/2020    CREATININE 1.5 03/30/2020    CALCIUM 9.9 03/30/2020    GFRAA 56 03/30/2020    LABGLOM 46 03/30/2020     PT/INR:  No results found for: PROTIME, INR        Imaging: [unfilled]          Impression:   BPH with LUTS  Urinary retention with loyola catheter in place  (prior > 1000ml of urine in bladder)  History of bladder dysfunction      Recommendation:   Proceed with TURP  Replace loyola after surgery  Ancef and Cipro for procedure      Patient seen and examined, chart reviewed.      Electronically signed by Navi Chan MD on 2/21/2022 at 12:52 PM

## 2022-02-21 NOTE — PROGRESS NOTES
1620 Pt received from PACU and report received from [facility]. Pt denies c/o. Pt has loyola catheter. Pt given Doris mist. Call light in reach. Loyola catheter to gravity draining pink tinged urine. CAll light in reach. Wife at bedside. 1645 In to check on pt. Pt readty to get dressed to go home. Pt given leg bag and instructed pt and wife on loyola cath care. 1650 Discharge instructions given to pt and wife. Both verbalized understanding of instructions. Call light in reach. 1 [facility]. Gallo Bradford in room to talk to pt. [phone] Pt discharged to home per wheelchair to wife's vehicle to drive him home.

## 2022-02-21 NOTE — BRIEF OP NOTE
Brief Postoperative Note      Patient: Jonnathan Gan  YOB: 1945  MRN: [id number]    Date of Procedure: 2/21/2022    Pre-Op Diagnosis: Enlarged prostate with urinary obstruction [N40.1, N13.8]                                Urinary retention    Post-Op Diagnosis: Same       Procedure(s):  CYSTOSCOPY TRANSURETHRAL RESECTION PROSTATE    Surgeon(s):  Triston Ca MD    Assistant:      Anesthesia: General    Estimated Blood Loss (mL): 52JE    Complications: None    Specimens:   ID Type Source Tests Collected by Time Destination   A : PIECES OF PROSTATE Tissue Prostate SURGICAL PATHOLOGY Triston Ca MD 2/21/2022 1415        Implants:        Drains: 22 Am[address] 3 way urinary catheter with catheter plug      Findings:   1. Resection time less than one hour  2. Landmarks veru to bladder neck  3. Both UO's seen and unharmed during procedure  4. Grade I bladder trabeculation  5. Prostate 45 grams on GABRIEL w/o nodules  6. Irritated prostate tissue from right lateral lobe  7.   No visible bleeding around catheter and irrigation clear at end of procedure    Electronically signed by Triston Ca MD on 2/21/2022 at 3:07 PM

## 2022-02-21 NOTE — ANESTHESIA PRE PROCEDURE
Department of Anesthesiology  Preprocedure Note       Name:  Hannah Curran   Age:  78 y.o.  :  1945                                          MRN:  [id number]         Date:  2022      Surgeon: Richard Lowe):  Isaías Seo MD    Procedure: Procedure(s):  CYSTOSCOPY TRANSURETHRAL RESECTION PROSTATE    Medications prior to admission:   Prior to Admission medications    Not on File       Current medications:    Current Facility-Administered Medications   Medication Dose Route Frequency Provider Last Rate Last Admin    lactated ringers infusion   IntraVENous Once Isaías Seo MD        ceFAZolin (ANCEF) 2000 mg in dextrose 5 % 100 mL IVPB  2,000 mg IntraVENous Once Isaías Seo MD           Allergies:  No Known Allergies    Problem List:  There is no problem list on file for this patient. Past Medical History:  History reviewed. No pertinent past medical history. Past Surgical History:        Procedure Laterality Date    APPENDECTOMY      HERNIA REPAIR         Social History:    Social History     Tobacco Use    Smoking status: Never Smoker    Smokeless tobacco: Never Used   Substance Use Topics    Alcohol use: Not Currently                                Counseling given: Not Answered      Vital Signs (Current):   Vitals:    22 1158 22 1045   BP:  (!) 144/75   Pulse:  89   Resp:  16   TempSrc:  Temporal   SpO2:  95%   Weight: 190 lb (86.2 kg)    Height: 6' (1.829 m)                                               BP Readings from Last 3 Encounters:   22 (!) 144/75   20 (!) 161/80       NPO Status:                                                                                 BMI:   Wt Readings from Last 3 Encounters:   22 190 lb (86.2 kg)   20 210 lb (95.3 kg)     Body mass index is 25.77 kg/m².     CBC:   Lab Results   Component Value Date    WBC 9.1 2020    RBC 5.04 2020    HGB 15.8 2020    HCT 46.9 2020    MCV 93.1 2020 RDW 13.2 03/30/2020     03/30/2020       CMP:   Lab Results   Component Value Date     03/30/2020    K 4.4 03/30/2020     03/30/2020    CO2 21 03/30/2020    BUN 21 03/30/2020    CREATININE 1.5 03/30/2020    GFRAA 56 03/30/2020    LABGLOM 46 03/30/2020    GLUCOSE 116 03/30/2020    PROT 7.2 03/30/2020    CALCIUM 9.9 03/30/2020    BILITOT 0.4 03/30/2020    ALKPHOS 70 03/30/2020    AST 20 03/30/2020    ALT 13 03/30/2020       POC Tests: No results for input(s): POCGLU, POCNA, POCK, POCCL, POCBUN, POCHEMO, POCHCT in the last 72 hours. Coags: No results found for: PROTIME, INR, APTT    HCG (If Applicable): No results found for: PREGTESTUR, PREGSERUM, HCG, HCGQUANT     ABGs: No results found for: PHART, PO2ART, TSO6JSE, NBE4LPR, BEART, F8KEUKVU     Type & Screen (If Applicable):  No results found for: LABABO, LABRH    Drug/Infectious Status (If Applicable):  No results found for: HIV, HEPCAB    COVID-19 Screening (If Applicable): No results found for: COVID19        Anesthesia Evaluation  Patient summary reviewed and Nursing notes reviewed  Airway: Mallampati: II  TM distance: >3 FB   Neck ROM: limited  Mouth opening: > = 3 FB Dental: normal exam         Pulmonary:Negative Pulmonary ROS and normal exam              Patient did not smoke on day of surgery. Cardiovascular:  Exercise tolerance: good (>4 METS),         NYHA Classification: I                 Beta Blocker:  Not on Beta Blocker         Neuro/Psych:   Negative Neuro/Psych ROS              GI/Hepatic/Renal:            ROS comment: BPH. Endo/Other: Negative Endo/Other ROS                    Abdominal:             Vascular: negative vascular ROS. Other Findings:           Anesthesia Plan      general     ASA 2       Induction: intravenous. Anesthetic plan and risks discussed with patient and spouse.                       Nyla Juares MD   2/21/2022

## 2022-02-21 NOTE — ANESTHESIA POSTPROCEDURE EVALUATION
Department of Anesthesiology  Postprocedure Note    Patient: Char Lundberg  MRN: [id number]  [place]: 1945  Date of evaluation: 2/21/2022  Time:  3:21 PM     Procedure Summary     Date: 02/21/22 Room / Location: [place] [address] / [facility]    Anesthesia Start: 7566 Anesthesia Stop: 1520    Procedure: CYSTOSCOPY TRANSURETHRAL RESECTION PROSTATE (N/A Bladder) Diagnosis:       Enlarged prostate with urinary obstruction      (Enlarged prostate with urinary obstruction [N40.1, N13.8])    Surgeons: Titus Bonilla MD Responsible Provider: Carlton Lyons MD    Anesthesia Type: general ASA Status: 2          Anesthesia Type: general    Wander Phase I: Wander Score: 10    Wander Phase II:      Last vitals: Reviewed and per EMR flowsheets.        Anesthesia Post Evaluation    Patient location during evaluation: PACU  Patient participation: complete - patient participated  Level of consciousness: awake and alert  Pain score: 0  Airway patency: patent  Nausea & Vomiting: no nausea and no vomiting  Complications: no  Cardiovascular status: blood pressure returned to baseline  Respiratory status: acceptable, spontaneous ventilation and face mask

## 2022-02-22 NOTE — OP NOTE
[address] [place] [address], [address] [place] [address]                                OPERATIVE REPORT    PATIENT NAME: Vernon Huggins                        :        1945  MED REC NO:   [id number]                          ROOM:  ACCOUNT NO:   [de-identified]                           ADMIT DATE: 2022  PROVIDER:     Bridgett Brown MD    DATE OF PROCEDURE:  2022    PREOPERATIVE DIAGNOSES:  1.  Enlarged prostate with urinary obstruction, slow stream, incomplete  bladder emptying. 2.  Urinary retention. POSTOPERATIVE DIAGNOSES:  1.  Enlarged prostate with urinary obstruction, slow stream, incomplete  bladder emptying. 2.  Urinary retention. PROCEDURES PERFORMED:  1. Cystoscopy and transurethral resection of prostate. 2.  Urethral dilation. SURGEON:  Bridgett Brown MD    ANESTHESIA:  General.    ESTIMATED BLOOD LOSS:  25 mL. COMPLICATIONS:  None. SPECIMEN:  Prostatic chips. DRAINS PLACED:  A 22-Czech three-way urinary catheter with catheter  plugs. FINDINGS:  1. Resection time less than 1 hour. 2.  Surgical landmarks with veru to bladder neck. 3.  Both UO's seen and unharmed during procedure. 4.  Grade 1 bladder trabeculation. 5.  Prostate 45 gm on GABRIEL without nodules. 6.  Irritated-appearing prostatic tissue, especially from right lateral  lobe. 7.  No visible bleeding around catheter and irrigation was clear at the  end of procedure. 8.  Urethral dilation of the meatus to 28-Czech using sounds in a  serial fashion. DISPOSITION:  Stable to PACU. INDICATIONS FOR PROCEDURE:  The patient is a 30-year-old male with a  history of enlarged prostate with lower urinary tract symptoms. The  patient had urinary retention requiring an indwelling urinary catheter. The patient's AUA score was greater than 20, and he had failed medical  therapy. He has had BPH symptoms greater than 2 years.   I reviewed the  risks, benefits, and alternatives of the therapies. The patient elected  to proceed with surgery. We discussed the expected postoperative  management. OPERATIVE REPORT:  The patient received preoperative antibiotics of  Ancef and Cipro. He had compression boots in place. He was brought to  the operating room, and placed on the operating room table. A general  anesthetic was administered by the Anesthesia Service. His previous  Day catheter was removed. The patient was placed in dorsal lithotomy  position and prepped and draped in a sterile fashion after being  appropriately padded. Time-out was performed per protocol. A 25-Faroese resectoscope with a 30-degree lens was placed through the  urethra and into the bladder using a visual obturator. The patient had  a median lobe with intravesical prostatic portions. He had grade 1  bladder trabeculation. There is no concerning masses or lesions found  in the bladder. The bladder was irrigated several times. Using a  24-Faroese loop, the prostate was resected. Prior to placement of a  25-Faroese resectoscope, urethral dilation of the meatus was performed  using sounds in a serial fashion to 28-Faroese. The surgical landmarks  of resection were the veru and the bladder neck. Both UO's were seen  and unharmed during this procedure. Resection time was less than 1  hour. He had grade 1 bladder trabeculation. The prostate was resected  again using a 24-Faroese loop. The prostatic chips were irrigated out. Following resection, there is a much improved appearance of patency of  the prostatic urethra. The prostatic chips were irrigated out. Both  UO's were seen and unharmed. The prostatic fossa was fulgurated using  electrocautery. Hemostasis was maintained. The resectoscope was then  removed. A 22-Faroese three-way catheter was placed into the bladder,  and the balloon was inflated. Catheter plug was placed in the  irrigation port.   The catheter irrigated well and was clear. There was  no bleeding around the catheter. A digital rectal examination was  performed, which demonstrated a 45 gm prostate without nodules. Upon  leaving the operating room, the irrigation was clear, and there was no  bleeding around the catheter. The patient tolerated the procedure well  and was brought to the PACU in stable condition. The patient will follow up in our office in approximately 1 to 2 weeks  for a voiding trial.  He will continue the Day catheter at discharge.         Alanis Choudhury MD    D: 02/21/2022 15:17:50       T: 02/21/2022 15:20:31     [email or web address]  Job#: [id number]     Doc#: [id number]    CC:

## 2022-08-15 PROBLEM — E83.52 HYPERCALCEMIA: Status: ACTIVE | Noted: 2022-08-15

## 2022-08-15 PROBLEM — R35.0 BENIGN PROSTATIC HYPERPLASIA WITH URINARY FREQUENCY: Status: ACTIVE | Noted: 2022-08-15

## 2022-08-15 PROBLEM — N18.32 STAGE 3B CHRONIC KIDNEY DISEASE (HCC): Status: ACTIVE | Noted: 2022-08-15

## 2022-08-15 PROBLEM — R42 VERTIGO: Status: ACTIVE | Noted: 2022-08-15

## 2022-08-15 PROBLEM — N40.1 BENIGN PROSTATIC HYPERPLASIA WITH URINARY FREQUENCY: Status: ACTIVE | Noted: 2022-08-15

## 2022-09-29 PROBLEM — N18.31 STAGE 3A CHRONIC KIDNEY DISEASE (HCC): Status: ACTIVE | Noted: 2022-09-29

## 2022-10-03 ENCOUNTER — HOSPITAL ENCOUNTER (EMERGENCY)
Age: 77
Discharge: ANOTHER HOSPITAL | End: 2022-10-04
Attending: EMERGENCY MEDICINE
Payer: MEDICARE

## 2022-10-03 ENCOUNTER — APPOINTMENT (OUTPATIENT)
Dept: CT IMAGING | Age: 77
End: 2022-10-03
Payer: MEDICARE

## 2022-10-03 DIAGNOSIS — R51.9 ACUTE NONINTRACTABLE HEADACHE, UNSPECIFIED HEADACHE TYPE: ICD-10-CM

## 2022-10-03 DIAGNOSIS — H53.9 VISION CHANGES: ICD-10-CM

## 2022-10-03 DIAGNOSIS — I61.9 INTRAPARENCHYMAL HEMORRHAGE OF BRAIN (HCC): Primary | ICD-10-CM

## 2022-10-03 LAB
APTT: 34.1 SECONDS (ref 25.1–37.1)
BASOPHILS ABSOLUTE: 0.1 K/CU MM
BASOPHILS RELATIVE PERCENT: 0.4 % (ref 0–1)
DIFFERENTIAL TYPE: ABNORMAL
EOSINOPHILS ABSOLUTE: 0.5 K/CU MM
EOSINOPHILS RELATIVE PERCENT: 3.8 % (ref 0–3)
GLUCOSE BLD-MCNC: 138 MG/DL (ref 70–99)
HCT VFR BLD CALC: 41.7 % (ref 42–52)
HEMOGLOBIN: 14.2 GM/DL (ref 13.5–18)
IMMATURE NEUTROPHIL %: 0.3 % (ref 0–0.43)
INR BLD: 0.92 INDEX
LYMPHOCYTES ABSOLUTE: 3.6 K/CU MM
LYMPHOCYTES RELATIVE PERCENT: 28.5 % (ref 24–44)
MCH RBC QN AUTO: 30.7 PG (ref 27–31)
MCHC RBC AUTO-ENTMCNC: 34.1 % (ref 32–36)
MCV RBC AUTO: 90.1 FL (ref 78–100)
MONOCYTES ABSOLUTE: 1.1 K/CU MM
MONOCYTES RELATIVE PERCENT: 8.9 % (ref 0–4)
NUCLEATED RBC %: 0 %
PDW BLD-RTO: 13.2 % (ref 11.7–14.9)
PLATELET # BLD: 256 K/CU MM (ref 140–440)
PMV BLD AUTO: 10 FL (ref 7.5–11.1)
PROTHROMBIN TIME: 11.9 SECONDS (ref 11.7–14.5)
RBC # BLD: 4.63 M/CU MM (ref 4.6–6.2)
SEGMENTED NEUTROPHILS ABSOLUTE COUNT: 7.4 K/CU MM
SEGMENTED NEUTROPHILS RELATIVE PERCENT: 58.1 % (ref 36–66)
TOTAL IMMATURE NEUTOROPHIL: 0.04 K/CU MM
TOTAL NUCLEATED RBC: 0 K/CU MM
WBC # BLD: 12.8 K/CU MM (ref 4–10.5)

## 2022-10-03 PROCEDURE — 70450 CT HEAD/BRAIN W/O DYE: CPT

## 2022-10-03 PROCEDURE — 80048 BASIC METABOLIC PNL TOTAL CA: CPT

## 2022-10-03 PROCEDURE — 85610 PROTHROMBIN TIME: CPT

## 2022-10-03 PROCEDURE — 85025 COMPLETE CBC W/AUTO DIFF WBC: CPT

## 2022-10-03 PROCEDURE — 99285 EMERGENCY DEPT VISIT HI MDM: CPT

## 2022-10-03 PROCEDURE — 85730 THROMBOPLASTIN TIME PARTIAL: CPT

## 2022-10-03 PROCEDURE — 84484 ASSAY OF TROPONIN QUANT: CPT

## 2022-10-03 PROCEDURE — 82962 GLUCOSE BLOOD TEST: CPT

## 2022-10-03 PROCEDURE — G0480 DRUG TEST DEF 1-7 CLASSES: HCPCS

## 2022-10-04 VITALS
HEART RATE: 58 BPM | RESPIRATION RATE: 20 BRPM | SYSTOLIC BLOOD PRESSURE: 140 MMHG | DIASTOLIC BLOOD PRESSURE: 62 MMHG | OXYGEN SATURATION: 92 % | TEMPERATURE: 98.5 F

## 2022-10-04 LAB
ALCOHOL SCREEN SERUM: <0.01 %WT/VOL
ANION GAP SERPL CALCULATED.3IONS-SCNC: 12 MMOL/L (ref 4–16)
BUN BLDV-MCNC: 15 MG/DL (ref 6–23)
CALCIUM SERPL-MCNC: 9.5 MG/DL (ref 8.3–10.6)
CHLORIDE BLD-SCNC: 102 MMOL/L (ref 99–110)
CO2: 21 MMOL/L (ref 21–32)
CREAT SERPL-MCNC: 1.3 MG/DL (ref 0.9–1.3)
GFR AFRICAN AMERICAN: >60 ML/MIN/1.73M2
GFR NON-AFRICAN AMERICAN: 54 ML/MIN/1.73M2
GLUCOSE BLD-MCNC: 156 MG/DL (ref 70–99)
POTASSIUM SERPL-SCNC: 3.8 MMOL/L (ref 3.5–5.1)
SODIUM BLD-SCNC: 135 MMOL/L (ref 135–145)
TROPONIN T: <0.01 NG/ML

## 2022-10-04 PROCEDURE — 6360000002 HC RX W HCPCS: Performed by: EMERGENCY MEDICINE

## 2022-10-04 PROCEDURE — 96374 THER/PROPH/DIAG INJ IV PUSH: CPT

## 2022-10-04 PROCEDURE — 2580000003 HC RX 258: Performed by: EMERGENCY MEDICINE

## 2022-10-04 RX ADMIN — LEVETIRACETAM 1000 MG: 100 INJECTION, SOLUTION INTRAVENOUS at 01:10

## 2022-10-04 NOTE — ED PROVIDER NOTES
CHIEF COMPLAINT    Chief Complaint   Patient presents with    Diplopia    Headache     HPI  Gage Feliz is a 78 y.o. male who presents to the ED via EMS with concerns for stroke like symptoms. Patient began to have headache and vision changes that started at 9:30 pm.  Patient was at home with his wife this evening when he began to complain of having difficulty seeing the clock and states that he had loss of vision to the bilateral temporal fields with associated headache. The headache is located particular in the occipital region but radiates anteriorly. The headache is rated as moderate in severity. Nothing makes it better or worse. EMS was called as the patient began to demonstrate some confusion. He arrives here complaining of vision changes and headache. Symptoms are constant. He denies fevers, chills, chest pain, nausea, vomiting, dizziness, lightheadedness. REVIEW OF SYSTEMS  Constitutional: No fever, chills or recent illness. Eye: Complains of vision changes  HENT: No earache or sore throat. Resp: No SOB or productive cough. Cardio: No chest pain or palpitations. GI: No abdominal pain, nausea, vomiting, constipation or diarrhea. No melena. : No dysuria, urgency or frequency. Endocrine: No heat intolerance, no cold intolerance, no polydipsia   Lymphatics: No adenopathy  Musculoskeletal: No new muscle aches or joint pain. Neuro: Complains of headache  Psych: No homicidal or suicidal thoughts  Skin: No rash, No itching. ?  ?   PAST MEDICAL HISTORY  Past Medical History:   Diagnosis Date    UTI (urinary tract infection)      FAMILY HISTORY  Family History   Problem Relation Age of Onset    No Known Problems Mother     No Known Problems Father     Cancer Maternal Grandfather      SOCIAL HISTORY  Social History     Socioeconomic History    Marital status:    Tobacco Use    Smoking status: Never    Smokeless tobacco: Never   Vaping Use    Vaping Use: Never used   Substance and Sexual Activity    Alcohol use: Not Currently    Drug use: Never    Sexual activity: Not Currently     Partners: Female       SURGICAL HISTORY  Past Surgical History:   Procedure Laterality Date    APPENDECTOMY      HERNIA REPAIR      TURP N/A 2/21/2022    CYSTOSCOPY TRANSURETHRAL RESECTION PROSTATE performed by Elena Thomas MD at [facility]  Previous Medications    ASPIRIN 81 MG EC TABLET    Take 81 mg by mouth in the morning. MULTIPLE VITAMINS-MINERALS (MULTIVITAMIN MEN 50+ PO)    Take 1 tablet by mouth daily     ALLERGIES  No Known Allergies    Nursing notes reviewed by myself for past medical history, family history, social history, surgical history, current medications, and allergies. PHYSICAL EXAM  VITAL SIGNS: Triage VS:    ED Triage Vitals   Enc Vitals Group      BP       Pulse       Resp       Temp       Temp src       SpO2       Weight       Height       Head Circumference       Peak Flow       Pain Score       Pain Loc       Pain Edu? Excl. in [address]? Constitutional: Well developed, Well nourished, nontoxic appearing  HENT: Normocephalic, Atraumatic, Bilateral external ears normal, Oropharynx moist, No oral exudates, Nose normal.   Eyes: PERRL, EOMI, Conjunctiva normal, No discharge. No scleral icterus. Neck: Normal range of motion, No tenderness, Supple. Lymphatic: No lymphadenopathy noted. Cardiovascular: Normal heart rate, Normal rhythm, No murmurs, gallops or rubs. Thorax & Lungs: Normal breath sounds, No respiratory distress, No wheezing. Abdomen: Soft, No tenderness, No masses, No pulsatile masses, No distention, Normal bowel sounds  Skin: Warm, Dry, Pink, No mottling, No erythema, No rash. Back: No tenderness, No CVA tenderness. Extremities: No edema, No tenderness, No cyanosis, Normal perfusion, No clubbing. Musculoskeletal: Good range of motion in all major joints as observed. No major deformities noted.    Neurologic: Alert & oriented to person and place but not time, patient is repetitive with his speech and confused, GCS 14 secondary to mild confusion, normal motor function, Normal sensory function, CN II-XII grossly intact as tested, No focal deficits noted. When examined the patient he demonstrates evidence of bilateral temporal hemianopsia  Psychiatric: Confused, cooperative  EKG  [unfilled]  RADIOLOGY  Labs Reviewed   CBC WITH AUTO DIFFERENTIAL - Abnormal; Notable for the following components:       Result Value    WBC 12.8 (*)     Hematocrit 41.7 (*)     Monocytes % 8.9 (*)     Eosinophils % 3.8 (*)     All other components within normal limits   BASIC METABOLIC PANEL - Abnormal; Notable for the following components:    Glucose 156 (*)     GFR Non- 54 (*)     All other components within normal limits   POCT GLUCOSE - Abnormal; Notable for the following components:    POC Glucose 138 (*)     All other components within normal limits   TROPONIN   PROTIME-INR   APTT   ETHANOL   URINALYSIS     I personally reviewed the images. The radiologist's interpretation reveals:  Last Imaging results   CT HEAD WO CONTRAST   Final Result   Acute intraparenchymal hemorrhage in the posterior left temporal lobe   measuring 4 x 2.5 cm. No significant midline shift. Extensive old ischemic changes. Critical results were called by Dr. Filippo Coronado to Dr. Haley Winslow On 10/3/2022   at 23:44. CTA HEAD NECK W CONTRAST    (Results Pending)       MEDS GIVEN IN ED:  Medications   levETIRAcetam (KEPPRA) 1,000 mg in sodium chloride 0.9 % 100 mL IVPB (has no administration in time range)   niCARdipine (CARDENE) 25 mg in dextrose 5 % 250 mL infusion (has no administration in time range)     COURSE & MEDICAL DECISION MAKING    78 yr old male presents to ER via EMS with complaints of headache and vision changes.   Wife is at home with symptoms started at 9:30 PM.  He arrives here complaining of headache and some vision changes which he has some difficulty describing but it seems he is experiencing some bilateral hemianopsia based on his exam.  Patient is confused to time as well as some of the events of the day. He has no other focal deficits noted. At this time stroke alert was initiated. He was sent to CT scan with CTA ordered as well. His wife arrived a short time later and tells me he takes a baby aspirin no other blood thinners. His CT image here demonstrates acute intracranial hemorrhage in the left posterior temporal lobe measuring 4 x 2.5 cm without significant midline shift. There are extensive old ischemic changes as well. Patient's blood pressure has remained between 272 systolic 176 systolic. However I do have Cardene ordered at bedside to maintain blood pressure lower than 160 mmHg. Page has been sent to [facility] for transfer as well. I elected for [facility] transfer as our neurosurgeon was unable to be contacted earlier for a separate patient with intracranial hemorrhage. Therefore I felt in the patient's best interest and safety to transfer outside of our network. I did order dose of Keppra for seizure prevention as well. Case was discussed with [facility] and patient has been accepted in the emergency department there with Dr. Julieth Avila. Patient transferred for further management. Critical Care Time: I have personally provided 60 minutes of critical care time (excluding separately listed billable procedures) through obtaining a history, examining the patient, reviewing relevant medical records, discussing care with family and/or other providers, performing multiple reassessments and directing care. Amount and/or Complexity of Data Reviewed  Clinical lab tests: reviewed  Decide to obtain previous medical records or to obtain history from someone other than the patient: yes       -  Patient seen and evaluated in the emergency department. -  Triage and nursing notes reviewed and incorporated. -  Old chart records reviewed and incorporated.   - Work-up included:  See above  -  Results discussed with patient. Appropriate PPE utilized as indicated for entire patient encounter? Time of Disposition: See timeline  ? New Prescriptions    No medications on file     FINAL IMPRESSION  1. Intraparenchymal hemorrhage of brain ([facility] [address]    2. Vision changes    3. Acute nonintractable headache, unspecified headache type        Electronically signed by:  Swapnil Haile DO, 10/4/2022         Swapnil Haile DO  10/04/22 [id number]

## 2022-10-04 NOTE — ED TRIAGE NOTES
Patient is here complaining of headache and vision changes. He states he only see half of things. Started an hour ago per the wife.

## 2022-10-07 ENCOUNTER — HOSPITAL ENCOUNTER (INPATIENT)
Age: 77
LOS: 2 days | Discharge: LEFT BEFORE DISCHARGE | DRG: 057 | End: 2022-10-09
Attending: PHYSICAL MEDICINE & REHABILITATION | Admitting: PHYSICAL MEDICINE & REHABILITATION
Payer: MEDICARE

## 2022-10-07 PROBLEM — I63.9 ACUTE CVA (CEREBROVASCULAR ACCIDENT) (HCC): Status: ACTIVE | Noted: 2022-10-07

## 2022-10-07 PROCEDURE — 6370000000 HC RX 637 (ALT 250 FOR IP): Performed by: PHYSICAL MEDICINE & REHABILITATION

## 2022-10-07 PROCEDURE — 94761 N-INVAS EAR/PLS OXIMETRY MLT: CPT

## 2022-10-07 PROCEDURE — 99223 1ST HOSP IP/OBS HIGH 75: CPT | Performed by: PHYSICAL MEDICINE & REHABILITATION

## 2022-10-07 PROCEDURE — 1280000000 HC REHAB R&B

## 2022-10-07 RX ORDER — ACETAMINOPHEN 325 MG/1
650 TABLET ORAL
Status: DISCONTINUED | OUTPATIENT
Start: 2022-10-07 | End: 2022-10-09 | Stop reason: HOSPADM

## 2022-10-07 RX ORDER — ONDANSETRON 4 MG/1
4 TABLET, ORALLY DISINTEGRATING ORAL 4 TIMES DAILY PRN
Status: DISCONTINUED | OUTPATIENT
Start: 2022-10-07 | End: 2022-10-09 | Stop reason: HOSPADM

## 2022-10-07 RX ORDER — SENNA PLUS 8.6 MG/1
1 TABLET ORAL 2 TIMES DAILY
Status: DISCONTINUED | OUTPATIENT
Start: 2022-10-07 | End: 2022-10-09

## 2022-10-07 RX ORDER — PROPRANOLOL HYDROCHLORIDE 10 MG/1
10 TABLET ORAL 2 TIMES DAILY
Status: DISCONTINUED | OUTPATIENT
Start: 2022-10-07 | End: 2022-10-09 | Stop reason: HOSPADM

## 2022-10-07 RX ORDER — POLYETHYLENE GLYCOL 3350 17 G/17G
17 POWDER, FOR SOLUTION ORAL DAILY PRN
Status: DISCONTINUED | OUTPATIENT
Start: 2022-10-07 | End: 2022-10-09 | Stop reason: HOSPADM

## 2022-10-07 RX ORDER — TRAMADOL HYDROCHLORIDE 50 MG/1
50 TABLET ORAL EVERY 6 HOURS PRN
Status: DISCONTINUED | OUTPATIENT
Start: 2022-10-07 | End: 2022-10-09 | Stop reason: HOSPADM

## 2022-10-07 RX ORDER — DOCUSATE SODIUM 100 MG/1
100 CAPSULE, LIQUID FILLED ORAL 2 TIMES DAILY
Status: DISCONTINUED | OUTPATIENT
Start: 2022-10-07 | End: 2022-10-09

## 2022-10-07 RX ORDER — ATORVASTATIN CALCIUM 40 MG/1
40 TABLET, FILM COATED ORAL NIGHTLY
Status: DISCONTINUED | OUTPATIENT
Start: 2022-10-07 | End: 2022-10-09 | Stop reason: HOSPADM

## 2022-10-07 RX ADMIN — SENNOSIDES 8.6 MG: 8.6 TABLET, FILM COATED ORAL at 21:02

## 2022-10-07 RX ADMIN — DOCUSATE SODIUM 100 MG: 100 CAPSULE, LIQUID FILLED ORAL at 21:03

## 2022-10-07 RX ADMIN — PROPRANOLOL HYDROCHLORIDE 10 MG: 10 TABLET ORAL at 21:02

## 2022-10-07 RX ADMIN — ATORVASTATIN CALCIUM 40 MG: 40 TABLET, FILM COATED ORAL at 21:03

## 2022-10-07 RX ADMIN — ACETAMINOPHEN 650 MG: 325 TABLET ORAL at 17:45

## 2022-10-07 RX ADMIN — ACETAMINOPHEN 650 MG: 325 TABLET ORAL at 21:01

## 2022-10-07 ASSESSMENT — PAIN SCALES - GENERAL
PAINLEVEL_OUTOF10: 0

## 2022-10-07 ASSESSMENT — PAIN - FUNCTIONAL ASSESSMENT: PAIN_FUNCTIONAL_ASSESSMENT: ACTIVITIES ARE NOT PREVENTED

## 2022-10-07 NOTE — PROGRESS NOTES
[facility] Admission Assessment      A Complete drug regimen review was completed for this patient this date. [x]  No clinically significant medication issue was identified   []  Yes, a clinically significant medication issue was identified     []  Adverse Drug Event:    []  Allergy:    []  Side Effect:    []  Ineffective Therapy:    []  Drug interaction:     []  Duplicate Therapy:    []  Untreated Indication:    []  Non-adherence:    []  Other:  Nursing contacted the physician:       Date:     10/07/2022           Time: 0116    Actions recommended by physician were completed:   Date: 10/07/2022               Time: 1200  Action(s) Taken:             [x]  New Physician Order Received    []  Issue Noted by Physician; However No Action Required    []  Other:        Ethnicity  \"Are you of , /a, or English origin? \"  Check all that apply:  [x] A. No, not of , /a, or [place] Origin  [] B.  Yes, [place] American, Chicano/a  [] C.  Yes, [address] [place] [address]  [] D.  Yes, [place]  [] E.  Yes, another , , or English origin  [] X. Patient unable to respond    Race  \"What is your race? \"  Check all that apply:  [x] A. White  [] B. Black or   [] C. American [facility] or [place] Native  [] D.  [facility]  [] E. [facility]  [] F. Mauritian  [] G. [place]  [] Gaurav Age  [] I. [place]  [] J.  Other   [] K.   [] L. [place] Islander or [place]  [] M. [place]er  [] N. Other [facility]  [] X. Patient unable to respond    Language  A. \"What is your preferred language? \"   English    B. \"Do you need or want an  to communicate with a doctor or health care staff? \"  Check only one:  [x] 0. No  [] 1. Yes  [] 9. Unable to determine    Transportation  \"Has lack of transportation kept you from medical appointments, meetings, work, or from getting things needed for daily living? \"Check all that apply:  [] A.  Yes, it has kept me from medical appointments or from getting my medications  [] B.  Yes, it has kept me from non-medical meetings, appointments, work, or from getting things that I need  [x] C.  No  [] X. Patient unable to respond    Hearing  Ability to hear (with hearing aid or hearing appliances if normally used)  [x]  0. Adequate - no difficulty in normal conversation, social interaction, listening to TV  []  1. Minimal difficulty - difficulty in some environments (e.g. when person speaks softly or setting is noisy)  []  2. Moderate difficulty - speaker has to increase volume and speak distinctly   []  3. Highly impaired - absence of useful hearing    Vision  Ability to see in adequate light (with glasses or other visual appliances)  []  0. Adequate - sees fine detail, such as regular print in newspapers/books  [x]  1. Impaired - sees large print, but nto regular print in newspapers/books  []  2. Moderately impaired - limited vision; not able to see newspaper headlines but can identify objects  []  3. Highly impaired - object identification in question, but eyes appear to follow objects  []  4. Severely impaired - no vision or sees only light, colors, or shapes; eyes do not appear to follow objects    Health Literacy  \"How often do you need to have someone help you when you read instructions, pamphlets, or other written material from your doctor or pharmacy? \"  []  0. Never  []  1. Rarely  []  2. Sometimes  [x]  3. Often  []  4. Always  []  8. Patient unable to respond    Signs and Symptoms of Delirium  A. Acute Onset Mental Status Change - Is there evidence of an acute change in mental status from the patient's baseline? [x] 0. No  [] 1. Yes    B. Inattention - Did the patient have difficulty focusing attention, for example being easily distractible or having difficulty keeping track of what was being said?  []  0. Behavior not present  [x]  1. Behavior continuously present, does not fluctuate  []  2.   Behavior present, fluctuates (comes and goes, changes in severity)    C. Disorganized thinking - Was the patient's thinking disorganized or incoherent (rambling or irrelevant conversation, unclear or illogical flow of ideas, or unpredictable switching from subject to subject)? []  0. Behavior not present  [x]  1. Behavior continuously present, does not fluctuate  []  2. Behavior present, fluctuates (comes and goes, changes in severity)    D. Altered level of consciousness - Did the patient have altered level of consciousness as indicated by any of the following criteria? Vigilant - startled easily to any sound or touch  Lethargic - repeatedly dozed off while being asked questions, but responded to voice or touch  Stuporous - very difficulty to arouse and keep aroused for the interview  Comatose - could not be aroused  [x]  0. Behavior not present  []  1. Behavior continuously present, does not fluctuate  []  2. Behavior present, fluctuates (comes and goes, changes in severity)    Mood    \"Over the last 2 weeks, have you been bothered by any of the following problems?\" 1. Symptom Presence    0 = No  1 = Yes  9 = No Response 2. Symptom Frequency    0 = Never or 1 day  1 = 2-6 days (several days)  2 = 7-11 days (half or more of the days)  3 = 12-14 days (nearly every day)  **Leave blank if 'No Reponse'**      Enter scores in boxes    Column 1 Column 2   Little interest or pleasure in doing things   [] 0. No  [x] 1. Yes  [] 9. No Response [] 0. Never or one day  [] 1.  2-6 days (several days)  [] 2.  7-11 days (half or more of days)  [x] 3.  12-14 days (nearly every day)     Feeling down, depressed, or hopeless   [] 0. No  [x] 1. Yes  [] 9. No Response [] 0. Never or one day  [x] 1.  2-6 days (several days)  [] 2.  7-11 days (half or more of days)  [] 3.  12-14 days (nearly every day)   **If either A or B in column 2 is coded 2 or 3, CONTINUE asking the questions below. If not, END the interview. **     Trouble falling or staying asleep, or sleeping too much   [x] 0. No  [] 1. Yes  [] 9. No Response [x] 0. Never or one day  [] 1.  2-6 days (several days)  [] 2.  7-11 days (half or more of days)  [] 3.  12-14 days (nearly every day   Feeling tired or having little energy   [] 0. No  [x] 1. Yes  [] 9. No Response [] 0. Never or one day  [x] 1.  2-6 days (several days)  [] 2.  7-11 days (half or more of days)  [] 3.  12-14 days (nearly every day   Poor appetite or overeating     [] 0. No  [x] 1. Yes  [] 9. No Response [] 0. Never or one day  [] 1.  2-6 days (several days)  [] 2.  7-11 days (half or more of days)  [x] 3.  12-14 days (nearly every day   Feeling bad about yourself - or that you are a failure or have let yourself or your family down   [] 0. No  [x] 1. Yes  [] 9. No Response [] 0. Never or one day  [] 1.  2-6 days (several days)  [] 2.  7-11 days (half or more of days)  [x] 3.  12-14 days (nearly every day   Trouble concentrating on things, such as reading the newspaper or watching television   [] 0. No  [x] 1. Yes  [] 9. No Response [] 0. Never or one day  [] 1.  2-6 days (several days)  [] 2.  7-11 days (half or more of days)  [x] 3.  12-14 days (nearly every day   Moving or speaking so slowly that other people could have noticed. Or the opposite- being so fidgety or restless that you have been moving around a lot more than usual.   [] 0. No  [x] 1. Yes  [] 9. No Response [] 0. Never or one day  [] 1.  2-6 days (several days)  [] 2.  7-11 days (half or more of days)  [x] 3.  12-14 days (nearly every day   Thoughts that you would be better off dead, or of hurting yourself in some way. [x] 0. No  [] 1. Yes  [] 9. No Response [x] 0. Never or one day  [] 1.  2-6 days (several days)  [] 2.  7-11 days (half or more of days)  [] 3.  12-14 days (nearly every day     Social Isolation  \"How often do you feel lonely or isolated from those around you? \"  [] 0. Never  [] 1. Rarely  [x] 2. Sometimes  [] 3. Often  [] 4. Always  [] 8. Patient unable to respond    Pain Effect on Sleep  \"Over the past 5 days, how much of the time has pain made it hard for you to sleep at night? \"  [x]  0. Does not apply - I have not had any pain or hurting in the past 5 days  []  1. Rarely or not at all  []  2. Occasionally  []  3. Frequently  []  4. Almost constantly  []  8. Unable to answer  **If the patient answers \"0. Does not apply\" to this question, skip the next two \"Pain Effect. Vane Brunilda Vane Brunilda \" questions**    Pain Interference with Therapy Activities  \"Over the past 5 days, how often have you limited your participation in rehabilitation therapy sessions due to pain? \"  []  0. Does not apply - I have not received rehabilitation therapy in the past 5 days  [x]  1. Rarely or not at all  []  2. Occasionally  []  3. Frequently  []  4. Almost constantly  []  8. Unable to answer    Pain Interference with Day-to-Day Activities: \"Over the past 5 days, how often have you limited your day-to-day activities (excluding rehabilitation therapy session)? \"  [x]  1. Rarely or not at all  []  2. Occasionally  []  3. Frequently  []  4. Almost constantly  []  8. Unable to answer    Nutritional Approaches  Check all of the following nutritional approaches that apply on admission:  []  A. Parenteral/IV feeding  []  B. Feeding tube (e.g., nasogastric or abdominal (PEG))  []  C. Mechanically altered diet - requires change in texture of food or liquids (e.g., pureed food, thickened liquids)  []  D. Therapeutic diet (e.g., low salt, diabetic, low cholesterol)  [x]  Z. None of the above    High Risk Drug Classes:  Use and Indication    Is taking: Check if the pt is taking any medications by pharmacological classification, not how it is used, in the following classes  Indication noted:  If column 1 is checked, check if there is an indication noted for all meds in the drug class Is taking  (check all that apply) Indication noted (check all that apply)   Antipsychotic [] []   Anticoagulant [x] [x]   Antibiotic [] []   Opioid [] []   Antiplatelet [] []   Hypoglycemic (including insulin) [] []   None of the above []     Special Treatments, Procedures, and Programs    Check all of the following treatments, procedures, and programs that apply on admission. On admission (check all that apply)   Cancer Treatments   A1. Chemotherapy []           A2. IV []           A3. Oral []           A10. Other []   B1. Radiation []   Respiratory Therapies   C1. Oxygen Therapy [x]           C2. Continuous []           C3. Intermittent [x]           C4. High-concentration []   D1. Suctioning []           D2. Scheduled []           D3. As needed []   E1. Tracheostomy Care []   F1. Invasive Mechanical Ventilator (ventilator or respirator) []   G1. Non-invasive Mechanical Ventilator []           G2. BiPAP []           G3. CPAP []   Other   H1. IV Medications []           H2. Vasoactive medications []           H3. Antibiotics []           H4. Anticoagulation [x]           H10. Other []   I1. Transfusions []   J1. Dialysis []           J2. Hemodialysis []           J3. Peritoneal dialysis []   O1. IV access []           O2. Peripheral []           O3. Midline []           O4.  Central (PICC, tunneled, port) []      None of the above (select if no Cancer, Respiratory, or Other boxes are checked) []

## 2022-10-07 NOTE — PROGRESS NOTES
Pt arrived to unit via [employer]. Pt orientated to room and to call light. Pt resting comfortably at this time.

## 2022-10-07 NOTE — PROGRESS NOTES
4 Eyes Skin Assessment     NAME:  Mynor Ambrose  YOB: 1945  MEDICAL RECORD NUMBER:  [id number]    The patient is being assess for  Admission    I agree that 2 RN's have performed a thorough Head to Toe Skin Assessment on the patient. ALL assessment sites listed below have been assessed. Areas assessed by both nurses:    Head, Face, Ears, Shoulders, Back, Chest, Arms, Elbows, Hands, Sacrum. Buttock, Coccyx, Ischium, and Legs. Feet and Heels        Does the Patient have a Wound?  No noted wound(s)       Nick Prevention initiated:  No   Wound Care Orders initiated:  No    Pressure Injury (Stage 3,4, Unstageable, DTI, NWPT, and Complex wounds) if present place referral/consult order under [de-identified] No    New and Established Ostomies if present place consult order under : No      Nurse 1 eSignature: Electronically signed by Charisse Stark LPN on 46/1/66 at 69:19 PM EDT    **SHARE this note so that the co-signing nurse is able to place an eSignature**    Nurse 2 eSignature: Electronically signed by Yonny Acuna LPN on 75/5/89 at 75:15 PM EDT

## 2022-10-07 NOTE — PLAN OF CARE
ARU Interdisciplinary Plan of Care (IPOC)  [facility] Dr. Elda Sol [facility], [facility] [facility] [address]  [phone]  Fax: [phone]        Converse Ravinder    : 1945  Acct #: [de-identified]  MRN: [id number]   PHYSICIAN:  Karla Diaz MD  Primary Active Problems:   Active Hospital Problems    Diagnosis Date Noted    Acute CVA (cerebrovascular accident) [facility]) [I63.9] 10/07/2022     Priority: Medium       Rehabilitation Diagnosis:     Cerebral infarction, unspecified [I63.9]  Acute CVA (cerebrovascular accident) [facility]) [I63.9]          CARE PLAN     NURSING:  Roe Castellon while on this unit will:      Bowel and Bladder   [x] Be continent of bowel and bladder      [x] Have an adequate number of bowel movements   [] Urinate with no urinary retention >300ml in bladder   [] Bladder Scan: (details)   [] Complete bladder protocol with loyola removal   [] Initiate Bladder Program to toilet every ___ hours   [] Initiate Bowel Program to toilet every ___hours   [] Bladder training    [] Bowel training  Pulmonary   [x] Maintain O2 SATs at 92% or greater  Pain Management   [x] Have pain managed while on ARU        [] Be pain free by discharge    [] Medication Management and Education  Maintenance of Skin Integrity/Wound Management   [x] Have no skin breakdown while on ARU   [] Have improved skin integrity via wound measurements   [] Have no signs/symptoms of infection via infection protection and monitoring at the          wound site  Fall Prevention   [x] Be free from injury during hospitalization via fall prevention measures     [] Disease management and Education  Precautions   [] Weight Bearing Precautions   [] Swallowing Precautions   [x] Monitoring of Risks of Complications   [x] DVT Prophylaxis    [] Fluid/electrolyte/Nutrition Management    [x] Complete education with patient/family with understanding demonstrated for          in-room safety with transfers to bed, toilet, wheelchair, shower as well as                bathroom activities and hygiene. [] Adjustment   [] Other:   Nursing interventions may include bowel/bladder training, education for medical assistive devices, medication education, O2 saturation management, energy conservation, stress management techniques, fall prevention, alarms protocol, seating and positioning, skin/wound care, pressure relief instruction,dressing changes,  infection protection, DVT prophylaxis, and/or assistance with in room safety with transfers to bed, toilet, wheelchair, shower as well as bathroom activities and hygiene. Patient/caregiver education for:   [] Disease/sustained injury/management      [x] Medication Use   [] Surgical intervention   [x] Safety/Precautions   [] Body mechanics and or joint protection   [x] Health maintenance         PHYSICAL THERAPY:  Goals:                  Short Term Goals  Time Frame for Short Term Goals: 1 week  Short Term Goal 1: pt will complete bed mobility at mod ind  Short Term Goal 2: pt will complete STS, bed to chair, car transfers at mod ind  Short Term Goal 3: pt will ambulate 500 ft using LRAD over level tile, uneven surfaces, up-down ramps at mod inf  Short Term Goal 4: pt will ascend/descend 16 steps at mod ind for safe negotiation of home environment including entry and exit  Short Term Goal 5: pt will retrieve light object off the floor at mod ind w/o use of AE               These goals were reviewed with this patient at the time of assessment and Heather Dickinson is in agreement. Plan of Care: Pt to be seen 5 days per week for a minimum of 60 minutes for *** days.                 Current Treatment Recommendations: Strengthening, Balance training, ROM, Functional mobility training, Endurance training, Transfer training, ADL/Self-care training, Gait training, Neuromuscular re-education, Cognitive/Perceptual training, Cognitive reorientation, Pain management, Safety education & training, Patient/Caregiver education & training, Equipment evaluation, education, & procurement, Modalities, Positioning, Therapeutic activities, Stair training community reintegration,animal assisted therapy, and concurrent/group therapy.     PT IRF-DAVIDE scores and goals for initial assessment:   Bed Mobility:   Sit to Lying  Assistance Needed: Supervision or touching assistance  Comment: sup  CARE Score: 4  Discharge Goal: Independent  Roll Left and Right  Assistance Needed: Independent  CARE Score: 6  Discharge Goal: Independent  Lying to Sitting on Side of Bed  Assistance Needed: Supervision or touching assistance  Comment: CGA from flat HOB  CARE Score: 4  Discharge Goal: Independent    Transfers:    Sit to Stand  Assistance Needed: Supervision or touching assistance  Comment: SBA from EOB, w/c, chair w/ armrests  CARE Score: 4  Discharge Goal: Independent  Chair/Bed-to-Chair Transfer  Assistance Needed: Supervision or touching assistance  Comment: CGA progressing to SBA ; slightly impulsive  CARE Score: 4  Discharge Goal: Independent     Car Transfer  Assistance Needed: Supervision or touching assistance  Comment: sup ; impulsive performance w/ pt initiated quality of movement; strong willed, free-spirited  CARE Score: 4  Discharge Goal: Independent    Ambulation:    Walking Ability  Does the Patient Walk?: Yes     Walk 10 Feet  Assistance Needed: Supervision or touching assistance  Comment: CGA initially for safety ; sup for later performance  CARE Score: 4  Discharge Goal: Independent     Walk 50 Feet with Two Turns  Assistance Needed: Supervision or touching assistance  Comment: sup  CARE Score: 4  Discharge Goal: Independent     Walk 150 Feet  Assistance Needed: Supervision or touching assistance  Comment: sup w/ cues for attention to path and directions ; cognition affecting command follow  CARE Score: 4  Discharge Goal: Independent     Walking 10 Feet on Uneven Surfaces  Assistance Needed: Supervision or touching assistance  Comment: SBA ; dec receptiveness to therapy pacing, impulsive  CARE Score: 4  Discharge Goal: Independent     1 Step (Curb)  Assistance Needed: Supervision or touching assistance  Comment: SBA w/ cues for performance  CARE Score: 4  Discharge Goal: Independent     4 Steps  Assistance Needed: Supervision or touching assistance  Comment: SBA ; impulsive performance  CARE Score: 4  Discharge Goal: Independent     12 Steps  Assistance Needed: Supervision or touching assistance  Comment: SBA ; impulsive performance and agitation w/ repeated stair training  CARE Score: 4  Discharge Goal: Independent    Gait Deviations: []None []Slow juju  [] Increased EKATERINA  [] Staggers []Deviated Path  [] Decreased step length  [] Decreased step height  []Decreased arm swing  [] Shuffles  [] Decreased head and trunk rotation  []other:        Wheelchair:  w/c Ability: Wheelchair Ability  Uses a Wheelchair and/or Scooter?: No (not applicable d/t level of function on eval)                Balance:    Balance  Sitting - Static: Good  Sitting - Dynamic: Good  Standing - Static: Good   Object: Picking Up Object  Assistance Needed: Independent  Comment: no AE and steady during performance  CARE Score: 6  Discharge Goal: Independent  OCCUPATIONAL THERAPY:  Goals:             Short Term Goals  Time Frame for Short Term Goals: STG=LTG  Additional Goals?: Yes :  Long Term Goals  Time Frame for Long Term Goals : 10-14 days or until discharge  Long Term Goal 1: Pt will perform eating with Ind. Long Term Goal 2: Pt will perform grooming with Ind.   Long Term Goal 3: Pt will perform UB dressing with Mod I.  Long Term Goal 4: Pt will perform LB dressing with Mod I.  Long Term Goal 5: Pt will don/doff footwear with Mod I.  Long Term Goal 6: Pt will perform bathing with Mod I.  Long Term Goal 7: Pt will perform toileting with Mod I.  Long Term Goal 8: Pt will perform all fxl transfers (bed, chair, toilet, shower) with [address] Term Goal 9: Pt will participate in therex/theract with emphasis on dynamic stance >12-15 min to increase fxl endurance and standing balance needed for ADLs/IADLs. :    These goals were reviewed with this patient at the time of assessment and Fernandadeidracarlos manueleffie Díaz is in agreement    Plan of Care:  Pt to be seen 5 days per week for a minimum of 60 minutes for *** days. Times Per Week: 5x/week   Occupational Therapy Plan  Times Per Week: 5x/week  Therapy Duration:  (10-14 days)  Specific Instructions for Next Treatment: Vision assessment/training, safety awareness, cognition training  Current Treatment Recommendations: Strengthening, Balance training, Functional mobility training, Endurance training, Gait training, Stair training, Neuromuscular re-education, Cognitive reorientation, Safety education & training, Pain management, Patient/Caregiver education & training, Equipment evaluation, education, & procurement, Positioning, Home management training, Self-Care / ADL, Cognitive/Perceptual training, Coordination training         cognitive training, home management, energy conservation training, community reintegration, splint fabrication, patient/caregiver education and training, animal assisted therapy, and concurrent and/or group therapy. OT IRF-DAVIDE scores and goals for initial assessment:    ADLs:    Eating: Eating  Assistance Needed: Supervision or touching assistance  Comment: Setup assist of breakfast tray, cues for initiation of self-feeding  CARE Score: 4  Discharge Goal: Independent       Oral Hygiene: Oral Hygiene  Assistance Needed: Supervision or touching assistance  Comment: Oral care in stance at sink with CGA. Cleanup assist needed for placing cap on toothpaste d/t [facility]. CARE Score: 4  Discharge Goal: Independent    UB/LB Bathing: Shower/Bathe Self  Assistance Needed: Supervision or touching assistance  Comment: Bathing seated/standing with SBA/CGA during stance.  Mod VC for sequencing and thoroughness. CARE Score: 4  Discharge Goal: Independent    UB Dressing: Upper Body Dressing  Assistance Needed: Supervision or touching assistance  Comment: Don shirt and sweatshirt seated with SBA, min VC for technique. CARE Score: 4  Discharge Goal: Independent         LB Dressing: Lower Body Dressing  Assistance Needed: Supervision or touching assistance  Comment: Don underwear and sweatpants with CGA during stance to pull over hips. CARE Score: 4  Discharge Goal: Independent    Donning and [place] Footwear: Putting On/Taking Off Footwear  Assistance Needed: Partial/moderate assistance  Comment: Doff/don socks x2 seated with Min A to adjust socks. CARE Score: 3  Discharge Goal: Independent      Toileting: Toileting Hygiene  Comment: Pt declined need to use bathroom  Reason if not Attempted: Patient refused  CARE Score: 7  Discharge Goal: Independent      Toilet Transfers: Toilet Transfer  Assistance needed: Supervision or touching assistance  Comment: Fxl mob to bathroom and transfer to/from toilet with CGA, min VC for direction of turn. Pt utilized R GB. CARE Score: 4  Discharge Goal: Independent      SPEECH THERAPY: (If ordered)  Plan of Care and Goals:       Goals:  Long Term Goals  Goal 1: Pt will demonstrate improved verbal expression and auditory comprehension to mild in order to participate in daily conversational tasks. Goal 2: Pt will improve reading/writing function to mild in order to improve ability to participate in IADLs and leisure tasks without frustration. Short Term Goals  Goal 1: Pt will complete picture description tasks with no more than min cues (L-R scanning/descrimination, item ID, etc) in 9/10 opportunities in order to demonstrate improved ability to ID items and associated actions r/t daily living. Goal 2: Pt will complete letter/word tracing tasks with picture/word association assistance in 9/10 opportunities in order to improve literacy.   Goal 3: Pt will comprehend complex directions with 5+ items of information in 9/10 opportunities in order to improve ability to participate in daily care. Treatments may include speech/language/communication therapy, cognitive training, animal assisted therapy, group therapy, education, and/or dysphagia therapy based on the above goals. Co-treats where appropriate with PT or OT to facilitate patient goals in functional tasks. These goals were reviewed with this patient at the time of assessment and Howard Pore is in agreement. CASE MANAGEMENT:  Goals:   Assist patient/family with discharge planning, patient/family counseling, assistance in obtaining recommended equipment and other services, and coordination with insurance during ARU stay. Patient Goals:  Return to maximum level of independent function. Nutrition Goals: Pt will consume at least 75% of his meals and supplements    Activities Prior to Admit:   Homemaking Responsibilities: Yes  Active : Yes  Mode of Transportation: Car, Truck  Occupation: Retired  Leisure & Hobbies: Car racing, watch football, watch historical youtube         Intensity of [address] will be seen a minimum of 3 hours of therapy per day/a minimum of 5 out of 7 days per week. [] In this rare instance due to the nature of this patient's medical involvement, this patient will be seen 15 hours per week (900 minutes within a 7 day period). Treatments may include therapeutic exercises, gait training, neuromuscular re-ed, transfer training, community reintegration, bed mobility, w/c mobility and training, self care, home mgmt, cognitive training, energy conservation,dysphagia tx, speech/language/communication therapy, group therapy, and patient/family education. In addition, dietician/nutritionist may monitor calorie count as well as intake and collaboratively work with SLP on dietary upgrades. Neuropsychology/Psychology may evaluate and provide necessary support.   Group therapy as appropriate to facilitate improved endurance, STR, COORD, function, safety, transfers, awareness and insight into deficits, problem solving, memory, and social interaction and engagement. Medical issues being managed closely and that require 24 hour availability of a physician:   [] Swallowing Precautions                                     [] Weight bearing precautions   [] Wound Care                             [] Infection Prevention   [] DVT Prophylaxis/assessment              [] Monitoring for complications    [] Fall Precautions/Prevention                         [] Fluid/Electrolyte/Nutrition Balance   [] Voice Protection                           [] Medication Management   [] Respiratory                   [] Pain Mgmt   [] Bowel/Bladder Fx    Medical Prognosis: [] Good  [] Fair    [] Guarded   Total expected IRF days ***                                            Physician anticipated functional outcomes:  ***  Rehab Goals:   [] Return to premorbid function of_______________________________.    [] Independent   [] Mod I  [] Supervision  [] CGA   [] Min A   [] Mod A  Level for ambulation []without assistive device  [] with assistive device        [] Independent   [] Mod I  [] Supervision [] CGA   [] Min A   [] Mod A  Level for transfers []without assistive device  [] with assistive device         [] Independent   [] Mod I  [] Supervision [] CGA   [] Min A   [] Mod A Level with ADL's []without assistive device   [] with assistive device     ___________________     Level with cognitive skills requiring [] No assist [] Supervision  [] Active Assist/Cues     [] Maximize level of mobility and ADL's to decrease burden on caregiver    IPOC brief synthesis of Preadmission Screen, Post-Admission Evaluation, and Therapy Evaluations: ***  Anticipated discharge destination:    [] Home Independently   [] Home with supervision    []SNF     [] Other       This plan has been reviewed with me in a language I understand.  I have had the opportunity to include my input with my therapy team.    ________________________________________________   ______________________  Patient/Significant Other      Date    I have reviewed this initial plan of care and agree with its contents:    Title   Name    Date    Time    Physician:     Case Mgmt:    OT: Shaquille Naranjo OT 10/8/22 1011    PT: Gomez Leyva PT, DPT 10/8/2022 1300    RN: Racquel Party: Charlie Herbert CCC-DORA [id number] 10/8/22    Dietician:     ADMIT DATE:10/7/2022

## 2022-10-08 PROCEDURE — 97530 THERAPEUTIC ACTIVITIES: CPT

## 2022-10-08 PROCEDURE — 97112 NEUROMUSCULAR REEDUCATION: CPT

## 2022-10-08 PROCEDURE — 97166 OT EVAL MOD COMPLEX 45 MIN: CPT

## 2022-10-08 PROCEDURE — 97116 GAIT TRAINING THERAPY: CPT

## 2022-10-08 PROCEDURE — 97535 SELF CARE MNGMENT TRAINING: CPT

## 2022-10-08 PROCEDURE — 94150 VITAL CAPACITY TEST: CPT

## 2022-10-08 PROCEDURE — 1280000000 HC REHAB R&B

## 2022-10-08 PROCEDURE — 6370000000 HC RX 637 (ALT 250 FOR IP): Performed by: PHYSICAL MEDICINE & REHABILITATION

## 2022-10-08 PROCEDURE — 94761 N-INVAS EAR/PLS OXIMETRY MLT: CPT

## 2022-10-08 PROCEDURE — 97162 PT EVAL MOD COMPLEX 30 MIN: CPT

## 2022-10-08 PROCEDURE — 92523 SPEECH SOUND LANG COMPREHEN: CPT

## 2022-10-08 RX ADMIN — ACETAMINOPHEN 650 MG: 325 TABLET ORAL at 09:20

## 2022-10-08 RX ADMIN — DOCUSATE SODIUM 100 MG: 100 CAPSULE, LIQUID FILLED ORAL at 22:11

## 2022-10-08 RX ADMIN — PROPRANOLOL HYDROCHLORIDE 10 MG: 10 TABLET ORAL at 22:11

## 2022-10-08 RX ADMIN — ACETAMINOPHEN 650 MG: 325 TABLET ORAL at 22:10

## 2022-10-08 RX ADMIN — SENNOSIDES 8.6 MG: 8.6 TABLET, FILM COATED ORAL at 09:14

## 2022-10-08 RX ADMIN — ACETAMINOPHEN 650 MG: 325 TABLET ORAL at 17:39

## 2022-10-08 RX ADMIN — ACETAMINOPHEN 650 MG: 325 TABLET ORAL at 12:07

## 2022-10-08 RX ADMIN — SENNOSIDES 8.6 MG: 8.6 TABLET, FILM COATED ORAL at 22:11

## 2022-10-08 RX ADMIN — ATORVASTATIN CALCIUM 40 MG: 40 TABLET, FILM COATED ORAL at 22:11

## 2022-10-08 RX ADMIN — DOCUSATE SODIUM 100 MG: 100 CAPSULE, LIQUID FILLED ORAL at 09:15

## 2022-10-08 RX ADMIN — PROPRANOLOL HYDROCHLORIDE 10 MG: 10 TABLET ORAL at 09:14

## 2022-10-08 ASSESSMENT — PAIN SCALES - GENERAL
PAINLEVEL_OUTOF10: 0

## 2022-10-08 NOTE — PLAN OF CARE
Problem: Discharge Planning  Goal: Discharge to home or other facility with appropriate resources  Outcome: Progressing     Problem: ABCDS Injury Assessment  Goal: Absence of physical injury  Outcome: Progressing     Problem: Nutrition Deficit:  Goal: Optimize nutritional status  Outcome: Progressing

## 2022-10-08 NOTE — PROGRESS NOTES
Subjective  Subjective: Pt asleep upon arrival with covers over head. Agreeable to session. Pain Level: 0       IRF-Hearing and Speech: Hearing, Speech, and Vision  Expression of Ideas and Wants: Frequent difficulty  Understanding Verbal and Non-Verbal Content: Usually understands  Ability to Hear: Adequate  Ability to See in Adequate Light: Moderately impaired  IRF-COG:  Cognitive Patterns  Cognitive Pattern Assessment Used: BIMS  Repetition of Three Words (First Attempt): 2  Temporal Orientation: Year: Correct  Temporal Orientation: Month: Missed by > 1 month or no answer  Temporal Orientation: Day:  Incorrect or no answer  Able to recall \"sock: No, could not recall  Able to recall \"blue\": No, could not recall  Able to recall \"bed\": No, could not recall  BIMS Summary Score: 5  IRF-CAM (Confusion Assessment Method): Confusion Assessment Method (CAM)  Is there evidence of an acute change in mental status from the patient's baseline?: Yes  Inattention: Behavior present, fluctuates (comes and goes, changes in severity)  Disorganized thinking: Behavior continuously present, does not fluctuate  Altered level of consciousness: Behavior not present      Objective:  Observation/Palpation  Posture: Fair             Vision  Vision Exceptions: Visual field cut  Vision - Basic Assessment  Visual History: No significant visual history  Patient Visual Report: Unable to keep objects in focus, Blurring of print when reading, Diplopia  Visual Field Cut: Right  Oculo Motor Control: Impaired (Delayed tracking in all quadrants)  Impairments: Scanning, Tracking, Smooth Pursuits, Visual Quadrant Loss  Vision Comments: R homonymous hemianopia  Hearing  Hearing: Within functional limits    ROM:      LUE AROM (degrees)  LUE AROM : WFL     Left Hand AROM (degrees)  Left Hand AROM: WFL     RUE AROM (degrees)  RUE AROM : WFL     Right Hand AROM (degrees)  Right Hand AROM: WFL    Strength:    LUE Strength  Gross LUE Strength: Exceptions to [facility]  L Shoulder Flex: 4/5  L Shoulder Ext: 4/5  L Elbow Flex: 4/5  L Elbow Ext: 4/5  L Hand General: 4/5  RUE Strength  Gross RUE Strength: Exceptions to [facility]  R Shoulder Flex: 4-/5  R Shoulder Ext: 4-/5  R Elbow Flex: 4-/5  R Elbow Ext: 4-/5  R Hand General: 4-/5    Quality of Movement: Tone RUE  RUE Tone: Normotonic  Tone LUE  LUE Tone: Normotonic  Coordination  Movements Are Fluid And Coordinated: No  Coordination and Movement Description: Fine motor impairments, Apraxia, Right UE, Left UE, Decreased accuracy       Sensation:    Sensation  Overall Sensation Status: WNL     ADLs:    Eating: Eating  Assistance Needed: Supervision or touching assistance  Comment: Setup assist of breakfast tray, cues for initiation of self-feeding  CARE Score: 4  Discharge Goal: Independent       Oral Hygiene: Oral Hygiene  Assistance Needed: Supervision or touching assistance  Comment: Oral care in stance at sink with CGA. Cleanup assist needed for placing cap on toothpaste d/t [facility]. CARE Score: 4  Discharge Goal: Independent    UB/LB Bathing: Shower/Bathe Self  Assistance Needed: Supervision or touching assistance  Comment: Bathing seated/standing with SBA/CGA during stance. Mod VC for sequencing and thoroughness. CARE Score: 4  Discharge Goal: Independent    UB Dressing: Upper Body Dressing  Assistance Needed: Supervision or touching assistance  Comment: Don shirt and sweatshirt seated with SBA, min VC for technique. CARE Score: 4  Discharge Goal: Independent         LB Dressing:   Lower Body Dressing  Assistance Needed: Supervision or touching assistance  Comment: Don underwear and sweatpants with CGA during stance to pull over hips. CARE Score: 4  Discharge Goal: Independent    Donning and [place] Footwear: Putting On/Taking Off Footwear  Assistance Needed: Partial/moderate assistance  Comment: Doff/don socks x2 seated with Min A to adjust socks. CARE Score: 3  Discharge Goal: Independent      Toileting:  Toileting Hygiene  Comment: Pt declined need to use bathroom  Reason if not Attempted: Patient refused  CARE Score: 7  Discharge Goal: Independent      Bed Mobility:    Bed mobility  Supine to Sit: Stand by assistance    Transfers:    Transfers  Stand Step Transfers: Contact guard assistance  Stand Pivot Transfers: Contact guard assistance  Sit to stand: Contact guard assistance  Stand to sit: Contact guard assistance     Shower Transfers  Shower - Transfer Type: To and From  Shower - Transfer To: Shower seat with back  Shower - Technique: Ambulating  Shower Transfers: Contact Guard  Shower Transfers Comments: Mod VC for direction of turn and safe technique     Toilet Transfer  Assistance needed: Supervision or touching assistance  Comment: Fxl mob to bathroom and transfer to/from toilet with CGA, min VC for direction of turn. Pt utilized R ARLEEN. CARE Score: 4  Discharge Goal: Independent    Functional Mobility:    Balance  Sitting Balance: Independent  Standing Balance: Contact guard assistance (CGA dynamic stance, SBA static stance)     Functional Mobility  Functional - Mobility Device: No device  Activity: To/from bathroom  Assist Level: Contact guard assistance  Functional Mobility Comments: Fxl mob to/from bathroom with CGA, no LOB. Mod VC for direction of path. Cognition:  Cognition  Overall Cognitive Status: Exceptions  Arousal/Alertness: Delayed responses to stimuli  Following Commands:  Follows one step commands consistently, Follows one step commands with repetition  Attention Span: Attends with cues to redirect  Memory: Decreased recall of biographical Information, Decreased long term memory, Decreased short term memory, Decreased recall of recent events, Decreased recall of precautions  Safety Judgement: Decreased awareness of need for safety, Decreased awareness of need for assistance (Irritable with need for assistance)  Problem Solving: Assistance required to generate solutions, Assistance required to implement solutions, Assistance required to correct errors made, Assistance required to identify errors made  Insights: Decreased awareness of deficits  Initiation: Requires cues for some  Sequencing: Requires cues for all  Cognition Comment: Poor memory. Impaired insight and understanding of fxl deficits. Pt req mod cues throughout session for direction following and sequencing of all tasks. Perception:  Perception  Overall Perceptual Status: Impaired (Impaired proprioception and R/L orientation)  Unilateral Attention: Cues to attend right visual field  Initiation: Cues to initiate tasks  Motor Planning: Cues to use objects appropriately  Perseveration: Not present      Assessment:       The patient is a 78year old male admitted onto ARU after hospitalization for CVA. Pt presented to ED with sudden onset headache, visual changes, confusion, and aphasia. Pt was found to have L temporal lobe ICH without midline shift. No surgical intervention, pt started on Asa and Statin. Pt continues to present with aphasia, cognitive deficits, and R homonymous hemianopia. Pt previously lived at home with wife and was Ind without AD. Pt now requires CGA for fxl mob and SBA-Min A for ADLs. Pt is not safe to return home given visual/cognitive deficits and impaired safety awareness. Pt will benefit from skilled OT services to maximize safety and Ind with ADLs, IADLs, and transfers to return to least restrictive environment. Plan to discharge home with assist PRN from wife in 10-14 days.        Treatment Diagnosis: CVA  Prognosis: Good  Decision Making: Medium Complexity  Clinical Presentation:  Performance deficits / Impairments: Decreased functional mobility , Decreased safe awareness, Decreased balance, Decreased coordination, Decreased ADL status, Decreased cognition, Decreased vision/visual deficit, Decreased posture, Decreased endurance, Decreased high-level IADLs, Decreased strength, Decreased fine motor control  Patient education:   ARU procotol, Role of O.T., O.T. plan of care, safety training  [x]   Patient goal was established and reviewed in Rehabtracker with patient and/or family this date. Discharge Recommendations:  TBD pending progress  Equipment Recommendations:  TBD pending progress    Goals:  Patient Goals   Patient goals : Return to PLOF, improve vision and cognition, use the phone. Pt required assist to generate goal.  Short Term Goals  Time Frame for Short Term Goals: STG=LTG  Additional Goals?: Yes  Long Term Goals  Time Frame for Long Term Goals : 10-14 days or until discharge  Long Term Goal 1: Pt will perform eating with Ind. Long Term Goal 2: Pt will perform grooming with Ind. Long Term Goal 3: Pt will perform UB dressing with Mod I.  Long Term Goal 4: Pt will perform LB dressing with Mod I.  Long Term Goal 5: Pt will don/doff footwear with Mod I.  Long Term Goal 6: Pt will perform bathing with Mod I.  Long Term Goal 7: Pt will perform toileting with Mod I.  Long Term Goal 8: Pt will perform all fxl transfers (bed, chair, toilet, shower) with Mod I.  Long Term Goal 9: Pt will participate in therex/theract with emphasis on dynamic stance >12-15 min to increase fxl endurance and standing balance needed for ADLs/IADLs.     Plan:    Pt will be seen at least 60 minutes per day for a minimum of 5 days per week, plus group therapy as appropriate  Current Treatment Recommendations: Strengthening, Balance training, Functional mobility training, Endurance training, Gait training, Stair training, Neuromuscular re-education, Cognitive reorientation, Safety education & training, Pain management, Patient/Caregiver education & training, Equipment evaluation, education, & procurement, Positioning, Home management training, Self-Care / ADL, Cognitive/Perceptual training, Coordination training    OT Individual Minutes  Time In: 4294  Time Out: 0940  Minutes: 80                Number of Minutes/Billable Intervention      OT Evaluation 30   Therapeutic Exercise    ADL Self-care 60   Neuro Re-Ed    Therapeutic Activity    Group    Other:    TOTAL 90     Electronically signed by:    NERISSA Diaz  10/8/2022, 11:37 AM

## 2022-10-08 NOTE — CONSULTS
Comprehensive Nutrition Assessment    Type and Reason for Visit:  Initial, Consult (oral nutrition supplement)    Nutrition Recommendations/Plan:   Continue Regular Diet  Begin low gurmeet high protein oral nutrition supplement bid, between meals     Malnutrition Assessment:  Malnutrition Status:  Insufficient data (10/08/22 1100)    Context:  Acute Illness       Nutrition Assessment:    Pt admitted to ARU from [place] with acute CVA, left intracranial hemorrhage, did not require surgery. H/O CKD, BPH. Pt reports poor intake and wt loss PTA. Able to feed self and consuming 76% to all of Regular Diet so far here. No recent wt loss noted per Epic history. Will order low gurmeet high protein oral supplement bid to optimize intake and continue to follow as moderate nutrition risk. Nutrition Related Findings:    GFR 54, Glu 156 (10/3) Colace, Senokot Wound Type: None       Current Nutrition Intake & Therapies:    Average Meal Intake: %  Average Supplements Intake: None Ordered  ADULT DIET; Regular    Anthropometric Measures:  Height: 6' (182.9 cm)  Ideal Body Weight (IBW): 178 lbs (81 kg)    Admission Body Weight: 201 lb 11.5 oz (91.5 kg)  Current Body Weight: 201 lb 11.5 oz (91.5 kg), 113.3 % IBW. Current BMI (kg/m2): 27.4  Usual Body Weight: 200 lb 3.2 oz (90.8 kg) (8/15/22)  % Weight Change (Calculated): 0.8                    BMI Categories: Overweight (BMI 25.0-29. 9)    Estimated Daily Nutrient Needs:  Energy Requirements Based On: Kcal/kg  Weight Used for Energy Requirements: Ideal  Energy (kcal/day): 0204-5121 (25-30 kcal/kg IBW)  Weight Used for Protein Requirements: Ideal  Protein (g/day): 81-97 (1-1.2 g/kg IBW)  Method Used for Fluid Requirements: 1 ml/kcal  Fluid (ml/day): 8181-9416    Nutrition Diagnosis:   Predicted inadequate energy intake related to acute injury/trauma as evidenced by poor intake prior to admission    Nutrition Interventions:   Food and/or Nutrient Delivery: Continue Current Diet, Start Oral Nutrition Supplement  Nutrition Education/Counseling: No recommendation at this time  Coordination of Nutrition Care: Continue to monitor while inpatient       Goals:     Goals: Meet at least 75% of estimated needs       Nutrition Monitoring and Evaluation:   Behavioral-Environmental Outcomes: None Identified  Food/Nutrient Intake Outcomes: Food and Nutrient Intake, Supplement Intake  Physical Signs/Symptoms Outcomes: Biochemical Data, Chewing or Swallowing, Constipation, Meal Time Behavior, Weight    Discharge Planning:    No discharge needs at this time     Serge Blanchard, [address],   Contact: [phone]

## 2022-10-08 NOTE — PROGRESS NOTES
Physical Therapy  [facility] ARU PHYSICAL THERAPY EVALUATION    Chart Review:  Past Medical History:   Diagnosis Date    Acute CVA (cerebrovascular accident) ([address] 10/7/2022    UTI (urinary tract infection)      Past Surgical History:   Procedure Laterality Date    APPENDECTOMY      HERNIA REPAIR      TURP N/A 2/21/2022    CYSTOSCOPY TRANSURETHRAL RESECTION PROSTATE performed by Marie Gómez MD at [facility] History: yes ; falls 2/2 dizziness  Social History:  Social/Functional History  Lives With: Spouse  Type of Home: House  Home Layout: Two level, Able to Live on Main level with bedroom/bathroom  Home Access: Level entry  Bathroom Shower/Tub: Tub/Shower unit, Doors  Bathroom Toilet: Standard  Bathroom Equipment: Grab bars in shower, Shower chair (Shower chair available, was not using PTA)  Bathroom Accessibility: Accessible  Has the patient had two or more falls in the past year or any fall with injury in the past year?: Yes (2 falls without injury)  Receives Help From: Family  ADL Assistance: Independent  Homemaking Assistance: Independent  Homemaking Responsibilities: Yes  Meal Prep Responsibility: Secondary  Laundry Responsibility: Secondary  Cleaning Responsibility: Secondary  Bill Paying/Finance Responsibility: Secondary  Shopping Responsibility: [address]: No  Health Care Management: Primary  Other (Comment): Pt performs lawn care  Ambulation Assistance: Independent  Transfer Assistance: Independent  Active : Yes  Mode of Transportation: Car, Truck  Education: College  Occupation: Retired  Type of Occupation:   Leisure & Hobbies: Car racing, watch football, watch historical youtube  IADL Comments: Pt manages own medications, does not use a pill box. Additional Comments: Pt sleeps on flat bed at home.     Restrictions:  Restrictions/Precautions  Restrictions/Precautions: Fall Risk, General Precautions  Required Braces or Orthoses?: No    Pain Level: 0    Objective:  Orientation  Overall Orientation Status: Impaired  Orientation Level: Disoriented to time, Oriented to person, Oriented to place, Oriented to situation        Vision  Vision Exceptions: Visual field cut  Hearing  Hearing: Within functional limits    Sensation:  Sensation  Overall Sensation Status: WNL    Observation:   Observation/Palpation  Posture: Fair (fwd head and shoulders)    ROM:   AROM RLE (degrees)  RLE AROM: WNL     AROM LLE (degrees)  LLE AROM : WNL    Strength:    Strength RLE  Strength RLE: WFL  Comment: grossly 4+/5  Strength LLE  Strength LLE: WFL  Comment: grossly 4+/5    Bed Mobility:   Lying to Sitting on Side of Bed  Assistance Needed: Supervision or touching assistance  Comment: CGA from flat HOB  CARE Score: 4  Discharge Goal: Independent  Roll Left and Right  Assistance Needed: Independent  CARE Score: 6  Discharge Goal: Independent  Sit to Lying  Assistance Needed: Supervision or touching assistance  Comment: sup  CARE Score: 4  Discharge Goal: Independent    Transfers:    Sit to Stand  Assistance Needed: Supervision or touching assistance  Comment: SBA from EOB, w/c, chair w/ armrests  CARE Score: 4  Discharge Goal: Independent  Chair/Bed-to-Chair Transfer  Assistance Needed: Supervision or touching assistance  Comment: CGA progressing to SBA ; slightly impulsive  CARE Score: 4  Discharge Goal: Independent     Car Transfer  Assistance Needed: Supervision or touching assistance  Comment: sup ; impulsive performance w/ pt initiated quality of movement; strong willed, free-spirited  CARE Score: 4  Discharge Goal: Independent    Ambulation:   Device used PTA: no AD   Walking Ability  Does the Patient Walk?: Yes     Walk 10 Feet  Assistance Needed: Supervision or touching assistance  Comment: CGA initially for safety ; sup for later performance  CARE Score: 4  Discharge Goal: Independent     Walk 50 Feet with Two Turns  Assistance Needed: Supervision or touching assistance  Comment: sup  CARE Score: 4  Discharge Goal: Independent     Walk 150 Feet  Assistance Needed: Supervision or touching assistance  Comment: sup w/ cues for attention to path and directions ; cognition affecting command follow  CARE Score: 4  Discharge Goal: Independent     Walking 10 Feet on Uneven Surfaces  Assistance Needed: Supervision or touching assistance  Comment: SBA ; dec receptiveness to therapy pacing, impulsive  CARE Score: 4  Discharge Goal: Independent     1 Step (Curb)  Assistance Needed: Supervision or touching assistance  Comment: SBA w/ cues for performance  CARE Score: 4  Discharge Goal: Independent     4 Steps  Assistance Needed: Supervision or touching assistance  Comment: SBA ; impulsive performance  CARE Score: 4  Discharge Goal: Independent     12 Steps  Assistance Needed: Supervision or touching assistance  Comment: SBA ; impulsive performance and agitation w/ repeated stair training  CARE Score: 4  Discharge Goal: Independent    Gait Deviations: []None []Slow juju  [] Increased EKATERINA  [] Staggers []Deviated Path  [] Decreased step length  [] Decreased step height  []Decreased arm swing  [] Shuffles  [] Decreased head and trunk rotation  [x]other: limited deficits ; intermittently inconsistent step placement, normal EKATERINA, reciprocal patterning, steady performance w/o noted staggers, dec step clearance over uneven surfaces, near impulsive juju, performing well w/o AD       Wheelchair:  w/c Ability: Wheelchair Ability  Uses a Wheelchair and/or Scooter?: No (not applicable d/t level of function on eval)    Balance:    Balance  Sitting - Static: Good  Sitting - Dynamic: Good  Standing - Static: Good   Object: Picking Up Object  Assistance Needed: Independent  Comment: no AE and steady during performance  CARE Score: 6  Discharge Goal: Independent    Assessment:   The patient is a 78year old male admitted onto ARU after hospitalization for L posterior frontal ICH admitted from OSH. He is from home w/ wife, lives in 2 level home w/ level entry, ind PLOF, no AD, splits home making w/ wife, hx of falls 2/2 dizziness. He is primarily impaired by cognitive deficits and safety awareness, additional impairments include strength, balance, endurance, functional mobility. He is mobilizing well, more unsteady over uneven surfaces, cognition and command following impacting safety, impulsive and agitated behaviors noted t/o. He demonstrates good prognosis to achieve goals est at mod ind 2/2 high functioning on eval ; cognition will impact. Recommending home w/ 24/7 assist/sup.     Body Structures, Functions, Activity Limitations Requiring Skilled Therapeutic Intervention: Decreased functional mobility , Decreased endurance, Decreased high-level IADLs, Decreased posture, Decreased vision/visual deficit, Decreased cognition, Decreased safe awareness, Decreased strength     Therapy Prognosis: Good  Decision Making: Medium Complexity    Patient education:   ARU schedule, ARU expectations for participation, plan of care  Treatment Initiated:  Functional mobility training, gait training, patient education  Barriers to Improvement:  cognition; impulsivity; selective command follow; sedentary lifestyle  Discharge Recommendations:  home w/ 24/7 assist/sup  Equipment Recommendations:  none    Goals:  Patient Goals   Patient Goals : return home  Short Term Goals  Time Frame for Short Term Goals: 1 week  Short Term Goal 1: pt will complete bed mobility at mod ind  Short Term Goal 2: pt will complete STS, bed to chair, car transfers at mod ind  Short Term Goal 3: pt will ambulate 500 ft using LRAD over level tile, uneven surfaces, up-down ramps at mod inf  Short Term Goal 4: pt will ascend/descend 16 steps at mod ind for safe negotiation of home environment including entry and exit  Short Term Goal 5: pt will retrieve light object off the floor at mod ind w/o use of AE     Plan:    Requires PT Follow-Up: Yes  Pt will be seen at least 60 minutes per day for a minimum of 5 days per week, plus group therapy as appropriate  [address] [facility]: 3 hours 5 days per week  Therapy Duration: 1 Week  Current Treatment Recommendations: Strengthening, Balance training, ROM, Functional mobility training, Endurance training, Transfer training, ADL/Self-care training, Gait training, Neuromuscular re-education, Cognitive/Perceptual training, Cognitive reorientation, Pain management, Safety education & training, Patient/Caregiver education & training, Equipment evaluation, education, & procurement, Modalities, Positioning, Therapeutic activities, Stair training    PT Individual Minutes  Time In: 1030  Time Out: 1200  Minutes: 90        Timed Code Treatment Minutes: 80 Minutes    Number of Minutes/Billable Intervention    Time in: 1030  Time out: 1200    PT Evaluation 10   Gait Training 30   Therapeutic Exercise    Neuro Re-Ed 30   Therapeutic Activity 20   Wheelchair Propulsion    Group    Other:    TOTAL 90       Electronically signed by:    Robni Dumont PT, DPT   10/8/2022, 1300

## 2022-10-08 NOTE — H&P
Reba Lo    : 1945  Acct #: [de-identified]  MRN: [id number]              History and physical      Admitting diagnosis: CVA/left occipital intracranial hemorrhage (IGC 1.4)    Comorbid diagnoses impacting rehabilitation: Ataxia of gait, bitemporal hemianopsia, aphasia, chronic kidney disease stage III, BPH with urinary retention    Chief complaint: Fatigue. Altered vision. History of present illness: Patient is a 72-year-old right-hand-dominant male who was in his usual state of fair health on 10/3/2022. That evening he developed a severe headache and visual changes. While he was discussing the concern with his wife about not being able to read the clock, he became more confused and she summoned EMS help. In our ED the patient demonstrated clumsiness of limb movements, confusion and difficulty speaking. The ED physician documented visual field changes as well. Urgent brain imaging identified a left posterior parietal intracranial hemorrhage. Patient was urgently transported to [facility] for neurosurgical evaluation. Follow-up imaging there identified extension of the hemorrhage into the occipital lobe without gross edema. Older infarctions were scattered throughout the brain on both sides. No seizure activity followed. He had persistent visual field loss, clumsy gait and impaired communication but strong limb movements. Some urinary retention was observed. Neurosurgical opinion was that he did not need evacuation of the intraparenchymal hemorrhage. He was taken off of antiplatelet therapy. He was unable to do his own toileting, transfers and self-care and could not return directly home. He requires inpatient debilitation to address these issues. Review of systems: He denies pain or nausea. It is obvious he cannot see well. He was having difficulty answering direct questions but did not complain of any other medical symptoms.   The remainder of their review of systems was negative except as mentioned in the history of present illness. Social History: The patient is . Premorbidly he is walking without assistive device and able to do his own medication administration. He drives locally. He reports that he has never smoked. He has never used smokeless tobacco. He reports that he does not currently use alcohol. He reports that he does not use drugs. Prior (baseline) level of function: Independent with mobility and self-care. Current level of function: Moderate verbal cues and physical assistance for mobility and self-care. Allergies:  Patient has no known allergies.     Past Medical History:   Past Medical History:   Diagnosis Date    Acute CVA (cerebrovascular accident) ([facility] [address] 10/7/2022    UTI (urinary tract infection)         Past Surgical History:     Past Surgical History:   Procedure Laterality Date    APPENDECTOMY      HERNIA REPAIR      TURP N/A 2/21/2022    CYSTOSCOPY TRANSURETHRAL RESECTION PROSTATE performed by Kia Yanes MD at [facility] OR       Current Medications:     Current Facility-Administered Medications:     atorvastatin (LIPITOR) tablet 40 mg, 40 mg, Oral, Nightly, JORGITO Sandoval MD    traMADol Thelma Horde) tablet 50 mg, 50 mg, Oral, Q6H PRN, JORGITO Sandoval MD    propranolol (INDERAL) tablet 10 mg, 10 mg, Oral, BID, JORGITO Sandoval MD    ondansetron (ZOFRAN-ODT) disintegrating tablet 4 mg, 4 mg, Oral, 4x Daily PRN, JORGITO Sandoval MD    senna (SENOKOT) tablet 8.6 mg, 1 tablet, Oral, BID, JORGITO Sandoval MD    docusate sodium (COLACE) capsule 100 mg, 100 mg, Oral, BID, JORGITO Sandoval MD    acetaminophen (TYLENOL) tablet 650 mg, 650 mg, Oral, 4x Daily WC, JORGITO Sandoval MD, 650 mg at 10/07/22 1745    polyethylene glycol (GLYCOLAX) packet 17 g, 17 g, Oral, Daily PRN, JORGITO Sandoval MD    magnesium hydroxide (MILK OF MAGNESIA) 400 MG/5ML suspension 30 mL, 30 mL, Oral, Daily PRN, JORGITO Sandoval MD    Family History:   Family History Problem Relation Age of Onset    No Known Problems Mother     No Known Problems Father     Cancer Maternal Grandfather        Exam:    Blood pressure (!) 119/59, pulse 68, temperature 99.1 °F (37.3 °C), temperature source Oral, resp. rate 17, height 6' (1.829 m), weight 201 lb 11.5 oz (91.5 kg), SpO2 95 %. General: Patient was seen recumbent in bed and then side-lying as well. He was passive in conversation. He made poor eye contact. He was in no distress. He was unable to offer much detail on questioning. HEENT: The patient's eye movements were conjugate and there was no nystagmus. He seemed to have a lack of peripheral vision bilaterally. Facial expression was symmetric. His tongue was slightly dry but midline. No fasciculations. No adenopathy or JVD. Pulmonary: Shallow respirations without wheezes or rales. Cardiac: Regular rate and rhythm. Abdomen: Patient's abdomen was soft and nondistended. Bowel sounds were present throughout. There was no rebound, guarding or masses noted. Spinal exam: No obvious percussion tenderness. Normal alignment. Skin intact. Upper extremities: Patient was able to bring both hands up to meet mine. Strength testing was compromised by his clumsiness. He did not appear to have localizing weakness. No bruising or swelling. Lower extremities: Clumsy movements across the knees and ankles. Full active dorsiflexion bilaterally. No atrophy or tremor. Heels clear. Sensation testing was inconsistent. Sitting balance was poor. Standing balance was poor.     Lab Results   Component Value Date    WBC 12.8 (H) 10/03/2022    HGB 14.2 10/03/2022    HCT 41.7 (L) 10/03/2022    MCV 90.1 10/03/2022     10/03/2022     Lab Results   Component Value Date    INR 0.92 10/03/2022    PROTIME 11.9 10/03/2022     Lab Results   Component Value Date    CREATININE 1.3 10/03/2022    BUN 15 10/03/2022     10/03/2022    K 3.8 10/03/2022     10/03/2022 CO2 21 10/03/2022     Lab Results   Component Value Date    ALT 13 03/30/2020    AST 20 03/30/2020    ALKPHOS 70 03/30/2020    BILITOT 0.4 03/30/2020         Impression: 79-year-old male with a history of chronic kidney disease and BPH who was suffered what appears to be not his first stroke. This was an intraparenchymal hemorrhage in the left posterior parietal and occipital lobes with visual compromise, ataxia and impaired cognition. Strengths for the patient: Supportive spouse, his premorbid independent habits and an accessible home. Limitations/barriers for the patient: His visual compromise, his fall risk and his poor cognition. Recommendation: Acute inpatient rehabilitation with occupational and physical therapy 180 minutes 5 out of every 7 days. Will address basic and  advancing mobility with self-care instruction and adaptive equipment training. Caregiver education will be offered. Expected length of stay  prior to a supervised level of function for discharge home with a walker and [facility] OT/PT is 2 weeks. Additional recommendation:    Acute CVA with left intracranial hemorrhage: Patient requires daily occupational and physical therapy with speech-language pathology. We will avoid antiplatelet and anticoagulants for now. Blood pressure control is important. He needs adaptive equipment training, caregiver education and low vision instruction. We must provide pulmonary hygiene measures, nutritional support and monitoring of bowel and bladder function. Outpatient follow-up with neurosurgery and his PCP. DVT prophylaxis: Chemoprophylaxis is contraindicated. Weightbearing activities will be pursued daily. SCDs when in bed. Chronic kidney disease: Avoiding wide fluctuations of blood pressure. Minimizing use of nephrotoxic medications. Encouraging consistent oral hydration. Periodic monitoring of his chemistries. BPH: Monitoring his urinary output.   It does not appear he was on any particular medications for this issue premorbidly. Should he demonstrate retention, Flomax may be in order      I personally performed a history and physical on this patient within 24 hours of admission to the rehab unit. I have reviewed the preadmission screening and concur with its findings without change. A detailed plan of care will be established by hospital day 4 and I attest the patient is appropriate for inpatient rehabilitation at this time. I have compared the patient's current functional status noted during my history and physical with that of the preadmission screen and I have found no significant differences.

## 2022-10-08 NOTE — PROGRESS NOTES
Speech Language Pathology  Facility/Department: [address] [place] [address]  Initial Speech/Language/Cognitive Assessment    NAME: Douglas Duke  : 1945   MRN: [id number]  ADMISSION DATE: 10/7/2022  ADMITTING DIAGNOSIS: has Enlarged prostate with lower urinary tract symptoms (LUTS); Benign prostatic hyperplasia with urinary frequency; Vertigo; Stage 3b chronic kidney disease ([address] Hypercalcemia; Stage 3a chronic kidney disease ([address] and Acute CVA (cerebrovascular accident) [facility]) on their problem list.  DATE ONSET: 10/04/22    Date of Eval: 10/8/2022   Evaluating Therapist: Anika Coronaan , SLP    RECENT RESULTS  CT OF HEAD/MRI:  EXAMINATION:   CT OF THE HEAD WITHOUT CONTRAST  10/3/2022 11:36 pm       TECHNIQUE:   CT of the head was performed without the administration of intravenous   contrast. Automated exposure control, iterative reconstruction, and/or weight   based adjustment of the mA/kV was utilized to reduce the radiation dose to as   low as reasonably achievable. COMPARISON:   None. HISTORY:   Reason for Exam: Headache, bilateral temporal hemianopsia       FINDINGS:   BRAIN/VENTRICLES: There is 4 x 2.5 cm acute intraparenchymal hemorrhage in   the posterior left temporal lobe. Mild surrounding edema is present. No   significant midline shift. No abnormal extra-axial fluid collection. The   gray-white differentiation is maintained without evidence of an acute   infarct. There is prominence of the ventricles and sulci due to global   parenchymal volume loss. There are nonspecific areas of hypoattenuation   within the periventricular and subcortical white matter, which likely   represent chronic microvascular ischemic change. ORBITS: The visualized portion of the orbits demonstrate no acute abnormality. SINUSES: The visualized paranasal sinuses and mastoid air cells demonstrate   no acute abnormality.        SOFT TISSUES/SKULL: No acute abnormality of the visualized skull or soft tissues. Impression   Acute intraparenchymal hemorrhage in the posterior left temporal lobe   measuring 4 x 2.5 cm. No significant midline shift. Extensive old ischemic changes. Critical results were called by Dr. Garcia Healy to Dr. Carlos Rooney On 10/3/2022   at 23:44. Primary Complaint:    Pt is a 79 yo man with a PMH signficant for CKD, BPH who was recently admitted to ARU following hospitalization at  where he was admitted on 10/3/22 with sudden onset HA, visual changes, confusion and aphasia. He was found with L temporal love ICH without midline shift. Pt continues with language difficulties and confusion. Pain:  Pain Assessment  Pain Assessment: None - Denies Pain  Pain Level: 0  Patient's Stated Pain Goal: 0 - No pain  Functional Pain Assessment: Activities are not prevented  Non-Pharmaceutical Pain Intervention(s): Emotional support, Distraction, Rest, Repositioned  Response to Pain Intervention: Patient satisfied    Vision/ Hearing  Vision  Vision: Impaired  Vision Exceptions: Visual field cut  Hearing  Hearing: Within functional limits    Assessment:    Pt was given the MS Aphasia Screening Test.    Expressive index looks at naming, automatic speech, repetition, writing, and verbal fluency    Receptive index looks at y/n accuracy, object recognition, following instructions, and reading instructions. Performance this date showed Daivd Guillen expressive and receptive abilities as follows:   Namin/10  Automatic Speech: 7/10  Repetition: 10/10  Writin/10  Verbal Fluency: 5/10  Receptive y/n accuracy: 18/20  Object recognition: 0/10  Following instructions: 4/10  Reading instructions: 0/10  Expressive subscale: 24/50  Receptive subscale: 22/50  Total Score: 46/100    Aphasia Diagnosis: Pt presents with moderate to severe language impairment.  Moderately impaired expressive language function characterized by difficulty with generative naming/object naming, initiation of automatic speech tasks (able to complete within semantic contex). Pt is able to complete repetition tasks and automatic tasks with semantic context. Pt was able to complete simple spelling tasks of words (sight words, name, etc), however, with written task, noted confabulation of letters, difficulty with ID of errors, although aware of difficulty of task. R visual field cut noted as factor, considerations for difficulty visually identifying objects as factor for generative writing tasks. Mod-severely impaired receptive function characterized by difficulty with R/L discrimination, sequencing, comprehension of auditorily presented information with 4 or more items. Pt was unable to ID objects in field of one, pictures, nor words/letters on this date. Reading function is severely impaired at this time. Recommendations:  Recommendations  Timed Code Treatment Minutes: 30 Minutes  Total Treatment Time: 30             Plan:   Speech Therapy Prognosis  Prognosis: Good  Prognosis Considerations: Severity of Impairments  Individuals consulted  Consulted and agree with results and recommendations: Patient  Safety Devices  Safety Devices in place: Yes  Type of devices: All fall risk precautions in place    Goals:  Long Term Goals  Goal 1: Pt will demonstrate improved verbal expression and auditory comprehension to mild in order to participate in daily conversational tasks. Goal 2: Pt will improve reading/writing function to mild in order to improve ability to participate in IADLs and leisure tasks without frustration. Short Term Goals  Goal 1: Pt will complete picture description tasks with no more than min cues (L-R scanning/descrimination, item ID, etc) in 9/10 opportunities in order to demonstrate improved ability to ID items and associated actions r/t daily living.   Goal 2: Pt will complete letter/word tracing tasks with picture/word association assistance in 9/10 opportunities in order to improve literacy. Goal 3: Pt will comprehend complex directions with 5+ items of information in 9/10 opportunities in order to improve ability to participate in daily care. Patient/family involved in developing goals and treatment plan: Patient aware    Subjective:   Previous level of function and limitations: Pt reports living with wife, PLOF as indicated below     Social/Functional History  Lives With: Spouse  Type of Home: House  Home Layout: Two level; Able to Live on Main level with bedroom/bathroom  Home Access: Level entry  Bathroom Shower/Tub: Tub/Shower unit;Doors  Bathroom Toilet: Standard  Bathroom Equipment: Grab bars in shower; Shower chair (Shower chair available, was not using PTA)  Bathroom Accessibility: Accessible  Receives Help From: Family  ADL Assistance: Independent  Homemaking Assistance: Independent  Homemaking Responsibilities: Yes  Meal Prep Responsibility: Secondary  Laundry Responsibility: Secondary  Cleaning Responsibility: Secondary  Bill Paying/Finance Responsibility: Secondary  Shopping Responsibility: Secondary  Dependent Care Responsibility: No  Health Care Management: Primary  Other (Comment): Pt performs lawn care  Ambulation Assistance: Independent  Transfer Assistance: Independent  Active : Yes  Mode of Transportation: Car;Truck  Education: College  Occupation: Retired  Type of Occupation:   Leisure & Hobbies: Car racing, watch football, watch [employer]  IADL Comments: Pt manages own medications, does not use a pill box. Additional Comments: Pt sleeps on flat bed at home.   Vision  Vision: Impaired  Vision Exceptions: Visual field cut  Hearing  Hearing: Within functional limits           Objective:       Oral Motor   Labial: No impairment  Lingual: No impairment  Velum: No Impairment  Mandible: No impairment    Motor Speech  Apraxic Characteristics: None  Dysarthric Characteristics: None  Overall Impairment Severity: None    Auditory Comprehension  Comprehension: Exceptions  Basic Questions: Mild  Complex Questions: Moderate  One Step Commands: Mild  Two Step Commands: Moderate  Multistep Commands: Severe  Complex/Abstract Commands: Severe  Common Objects: Severe  Pictures: Severe  L/R Discrimination: Moderate  Conversation: Moderate    Reading Comprehension  Reading Status: Exceptions to [facility]  Scanning/Tracking Impairment Severity: Moderate  Words Impairment Severity: Moderate  Phrases Impairment Severity: Severe  Sentence Impairment Severity: Severe  Paragraph Impairment Severity: Maximal  Interfering Components: Visual perceptual;Visual acuity  Effective Techniques: Large print    Expression  Primary Mode of Expression: Verbal    Verbal Expression  Verbal Expression: Exceptions to functional limits  Initiation: Mild   Repetition: WFL  Automatic Speech: Mild  Confrontation: Severe  Convergent: Moderate  Divergent: Severe  Responsive: Severe  Conversation: Mild  Effective Techniques: Word retrived strategies    Written Expression  Dominant Hand: Right  Written Expression: Exceptions to [facility]  Legibility Impairment Severity: Mild  Dictation Impairment Severity: Word   Trace Impairment Severity: Word;Phrase   Copy Impairment Severity: Word  Self formulation Impairment Severity: Word  Interfering Components: Other (able to verbally provide correct letters for some sight words, however, not able to translate to writing, incorrect letters provided)         Pragmatics/Social Functioning  Pragmatics: Exceptions to [facility] (inappropriate topics inconsistently)  Affect: Mild    Cognition:           Additional Assessments:             Prognosis:  Speech Therapy Prognosis  Prognosis: Good  Prognosis Considerations: Severity of Impairments  Individuals consulted  Consulted and agree with results and recommendations: Patient    Education:     Safety Devices in place: Yes  Type of devices:  All fall risk precautions in place    Therapy Time:   Individual Concurrent Group Co-treatment   Time In 1250         Time Out 1320         Minutes 30            Timed Code Treatment Minutes: 30 Minutes  Total Treatment Time: 30    Electronically signed by Tiffanie Baldwin on 10/8/2022 at 2:15 PM

## 2022-10-09 VITALS
DIASTOLIC BLOOD PRESSURE: 75 MMHG | SYSTOLIC BLOOD PRESSURE: 140 MMHG | TEMPERATURE: 98.3 F | HEART RATE: 75 BPM | HEIGHT: 72 IN | BODY MASS INDEX: 26.07 KG/M2 | WEIGHT: 192.46 LBS | OXYGEN SATURATION: 96 % | RESPIRATION RATE: 18 BRPM

## 2022-10-09 PROBLEM — I63.9 APHASIA DUE TO ACUTE STROKE (HCC): Status: ACTIVE | Noted: 2022-10-09

## 2022-10-09 PROBLEM — R27.0 ATAXIA: Status: ACTIVE | Noted: 2022-10-09

## 2022-10-09 PROBLEM — H53.461 RIGHT HOMONYMOUS HEMIANOPSIA: Status: ACTIVE | Noted: 2022-10-09

## 2022-10-09 PROBLEM — R47.01 APHASIA DUE TO ACUTE STROKE (HCC): Status: ACTIVE | Noted: 2022-10-09

## 2022-10-09 PROCEDURE — 6370000000 HC RX 637 (ALT 250 FOR IP): Performed by: PHYSICAL MEDICINE & REHABILITATION

## 2022-10-09 PROCEDURE — 99239 HOSP IP/OBS DSCHRG MGMT >30: CPT | Performed by: PHYSICAL MEDICINE & REHABILITATION

## 2022-10-09 RX ORDER — DOCUSATE SODIUM 100 MG/1
100 CAPSULE, LIQUID FILLED ORAL 2 TIMES DAILY PRN
Status: DISCONTINUED | OUTPATIENT
Start: 2022-10-09 | End: 2022-10-09 | Stop reason: HOSPADM

## 2022-10-09 RX ORDER — SENNA PLUS 8.6 MG/1
1 TABLET ORAL NIGHTLY PRN
Status: DISCONTINUED | OUTPATIENT
Start: 2022-10-09 | End: 2022-10-09 | Stop reason: HOSPADM

## 2022-10-09 RX ADMIN — ACETAMINOPHEN 650 MG: 325 TABLET ORAL at 08:40

## 2022-10-09 RX ADMIN — PROPRANOLOL HYDROCHLORIDE 10 MG: 10 TABLET ORAL at 08:40

## 2022-10-09 NOTE — PROGRESS NOTES
Patient's wife arrived to the unit and informed nursing staff she received a call from a stranger and the female informed her they found her  on the road and took patient to their home. Patient's wife confirmed this with their son who was on his way to their home. Notified Alexandru Carter RN Charge nurse of the above, and [employer] who came to the unit to speak to patient's wife. Kj whiteside. All of patient's belongings gathered and given to patient's wife. Patient's wife unsure if patient will be returning to the ARU. Patient's wife aware she can bring patient back and notify Zaria Le RN.

## 2022-10-09 NOTE — PROGRESS NOTES
Noted patient agitated and informed this nurse, \" I am leaving and I am going to sign out AMA. \" 1:1 given and informed patient this nurse would call patient's wife to make her aware of him wanting to go home. Patient verbalized understanding. Call placed to patient's wife - Rebekah and informed Skipwith patient wanted to leave and wanted her to come to the hospital to pick him up. Rebekah informed this nurse she was on her way and that she didn't want patient to leave the hospital. Rebekah informed this nurse she would be at the hospital soon. 1:1 given to patient informing him his wife was on the way and she wanted him to remain in the hospital and not leave. Teaching and education given to patient on reason to stay in the hospital to receive therapy and rehab. Noted patient seem to calm down some. Addressed all patient needs including pain, comfort, and assisted patient to the bathroom, then assisted patient back to bed. Patient continued to refuse video safety monitoring. Teaching and education done on bed and chair alarm. Patient voiced understanding. Patient in bed. Bed alarm on. All belongings and call light in reach. Notified Katerina Ramos, Charge RN of patient wanting to leave AMA.

## 2022-10-09 NOTE — PROGRESS NOTES
Patient's bed alarm going off. justo Kelly went into check on patient. Noted patient not in room, Leticia checked the bathroom, and patient not in bathroom. Patient was not noted in the hallway. Leticia notified Bahman Caicedo RN, then notified this nurse. Code Kevin Flight called. [address] [employer] [facility], RN notified. Notified Dr. Dea Haley. Entire unit, hospital, and hospital campus searched by security, and nursing staff. Unable to find patient. Notified Bahman Caicedo RN who notified house supervisor to call police. Patient's wife and son at hospital and aware of the code Kevin Flight and patient missing. 1:1 given to patient's wife assuring wife nursing staff, security, and police doing everything we can to find patient. Patient's wife verbalized understanding.

## 2022-10-09 NOTE — PROGRESS NOTES
Spoke with Morenita Ramírez by phone who stated she was at home with her  Hortencia Vivar and that he would not be returning to ARU. She stated that he was still worked up and that she didn't not feel returning to ARU would be helpful. She indicated that she would like to have assistance with setting up rehab at home. I spoke with Ira Sawyer who will reach out Monday to assist. I did advice Rise Na that if Geoffrey Collado experienced ay issues or concerns  that they should go to an ER to be evaluated.

## 2022-10-10 NOTE — DISCHARGE SUMMARY
Patient Name: Nash Wick  Patient :  1945  Patient MRN:   [id number]      Admission Date:  10/7/2022  Discharge Date: 10/9/2022    Admitting diagnosis: CVA/left occipital intracranial hemorrhage ( [place] [facility] 1.4)    Comorbid diagnoses impacting rehabilitation: Ataxia of gait, bitemporal hemianopsia, aphasia, chronic kidney disease stage III, BPH with urinary retention    Discharging diagnosis: CVA/left occipital intracranial hemorrhage ([facility] 1.4)    Comorbid diagnoses impacting rehabilitation: Ataxia of gait, bitemporal hemianopsia, aphasia, chronic kidney disease stage III, BPH with urinary retention    The patient left the hospital AMA. Please refer to the concurrent nursing documentation of today's activities. History of present illness: Patient is a 74-year-old right-hand-dominant male who was in his usual state of fair health on 10/3/2022. That evening he developed a severe headache and visual changes. While he was discussing the concern with his wife about not being able to read the clock, he became more confused and she summoned EMS help. In our ED the patient demonstrated clumsiness of limb movements, confusion and difficulty speaking. The ED physician documented visual field changes as well. Urgent brain imaging identified a left posterior parietal intracranial hemorrhage. Patient was urgently transported to [facility] for neurosurgical evaluation. Follow-up imaging there identified extension of the hemorrhage into the occipital lobe without gross edema. Older infarctions were scattered throughout the brain on both sides. No seizure activity followed. He had persistent visual field loss, clumsy gait and impaired communication but strong limb movements. Some urinary retention was observed. Neurosurgical opinion was that he did not need evacuation of the intraparenchymal hemorrhage. He was taken off of antiplatelet therapy.    Prior (baseline) level of function: Independent with mobility and self-care. Current level of function: Moderate verbal cues and physical assistance for mobility and self-care. Exam:    Blood pressure (!) 140/75, pulse 75, temperature 98.3 °F (36.8 °C), temperature source Oral, resp. rate 18, height 6' (1.829 m), weight 192 lb 7.4 oz (87.3 kg), SpO2 96 %. General: The patient was distracted by his overnight interaction with nursing staff. Poor insight and reasoning. HEENT: Some compromise of the visual field bilaterally. Facial expression was symmetric. Neck supple. MMM. No adenopathy or JVD. Pulmonary: Unlabored respirations without wheezes or rales. Cardiac: Regular rate and rhythm. Abdomen: Patient's abdomen was soft and nondistended. Bowel sounds were present throughout. There was no rebound, guarding or masses noted. Upper extremities: Clumsy movements of both upper limbs. No new bruising or swelling. Lower extremities: Clumsy movements across the knees and ankles. Wide base of support with gait. Sitting balance was fair-.  Standing balance was poor. Lab Results   Component Value Date    WBC 12.8 (H) 10/03/2022    HGB 14.2 10/03/2022    HCT 41.7 (L) 10/03/2022    MCV 90.1 10/03/2022     10/03/2022     Lab Results   Component Value Date    INR 0.92 10/03/2022    PROTIME 11.9 10/03/2022     Lab Results   Component Value Date    CREATININE 1.3 10/03/2022    BUN 15 10/03/2022     10/03/2022    K 3.8 10/03/2022     10/03/2022    CO2 21 10/03/2022     Lab Results   Component Value Date    ALT 13 03/30/2020    AST 20 03/30/2020    ALKPHOS 70 03/30/2020    BILITOT 0.4 03/30/2020           Despite multiple staff members encouraging him to complete his inpatient rehabilitation stay, the patient verbalized repeatedly a desire to leave [facility] today. Eventually, he left the hospital without our awareness and a code ground was executed.   The patient was found safe at home and declined to return to the hospital.    Due to the patient's decision and actions, no provisions for follow-up therapy, follow-up appointments with physicians, prescription medications nor adaptive equipment were in place at the time of his discharge. We will notify his primary care physician of the patient's decision. High Risk Drug Classes:  Use and Indication    Is taking: Check if the patient is taking any medications (or has them prescribed) by pharmacological classification, not how it is used, in the following classes  Indication noted: If column 1 is checked, check if there is an indication noted for all meds in the drug class Is taking  (check all that apply) Indication noted (check all that apply)   Antipsychotic [] []   Anticoagulant [] []   Antibiotic [] []   Opioid [] []   Antiplatelet [] []   Hypoglycemic (including insulin) [] []   None of the above [x]        Medication List        ASK your doctor about these medications      aspirin 81 MG EC tablet     MULTIVITAMIN MEN 50+ PO                CONDITION ON DISCHARGE: Stable. The prognosis is fair for further improvements in ADL's and safety with adapted gait/transfers. Record review, patient exam, discharge instructions, medication reconciliation and summary for this discharge visit took more than 30 minutes.

## 2023-03-06 NOTE — ED NOTES
Faxed facesheet to [facility] Advancement Flap Text: The defect edges were debeveled with a #15 scalpel blade.  The primary defect was closed partially with a complex linear closure.  Given the location of the remaining defect, shape of the defect and the proximity to free margins an O-T advancement flap was deemed most appropriate for complete closure of the defect.  Using a sterile surgical marker, an appropriate advancement flap was drawn incorporating the defect and placing the expected incisions within the relaxed skin tension lines where possible.    The area thus outlined was incised deep to adipose tissue with a #15 scalpel blade.  The skin margins were undermined to an appropriate distance in all directions utilizing iris scissors.

## 2023-11-03 ENCOUNTER — HOSPITAL ENCOUNTER (OUTPATIENT)
Dept: MRI IMAGING | Age: 78
Discharge: HOME | End: 2023-11-03
Payer: MEDICARE

## 2023-11-03 DIAGNOSIS — I63.9 CEREBRAL INFARCTION, UNSPECIFIED MECHANISM (HCC): ICD-10-CM

## 2023-11-03 PROCEDURE — 70551 MRI BRAIN STEM W/O DYE: CPT

## 2024-01-12 ENCOUNTER — OFFICE VISIT (OUTPATIENT)
Dept: NEUROLOGY | Age: 79
End: 2024-01-12
Payer: MEDICARE

## 2024-01-12 VITALS
DIASTOLIC BLOOD PRESSURE: 72 MMHG | WEIGHT: 193 LBS | HEIGHT: 72 IN | HEART RATE: 58 BPM | OXYGEN SATURATION: 98 % | BODY MASS INDEX: 26.14 KG/M2 | SYSTOLIC BLOOD PRESSURE: 122 MMHG

## 2024-01-12 DIAGNOSIS — I63.9 ISCHEMIC STROKE (HCC): ICD-10-CM

## 2024-01-12 DIAGNOSIS — F03.C0 SEVERE DEMENTIA, UNSPECIFIED DEMENTIA TYPE, UNSPECIFIED WHETHER BEHAVIORAL, PSYCHOTIC, OR MOOD DISTURBANCE OR ANXIETY (HCC): ICD-10-CM

## 2024-01-12 DIAGNOSIS — R48.2 APRAXIA: ICD-10-CM

## 2024-01-12 DIAGNOSIS — R47.01 RECEPTIVE APHASIA: Primary | ICD-10-CM

## 2024-01-12 PROCEDURE — 1036F TOBACCO NON-USER: CPT | Performed by: PSYCHIATRY & NEUROLOGY

## 2024-01-12 PROCEDURE — G8427 DOCREV CUR MEDS BY ELIG CLIN: HCPCS | Performed by: PSYCHIATRY & NEUROLOGY

## 2024-01-12 PROCEDURE — 99205 OFFICE O/P NEW HI 60 MIN: CPT | Performed by: PSYCHIATRY & NEUROLOGY

## 2024-01-12 PROCEDURE — G8484 FLU IMMUNIZE NO ADMIN: HCPCS | Performed by: PSYCHIATRY & NEUROLOGY

## 2024-01-12 PROCEDURE — 1123F ACP DISCUSS/DSCN MKR DOCD: CPT | Performed by: PSYCHIATRY & NEUROLOGY

## 2024-01-12 PROCEDURE — G8419 CALC BMI OUT NRM PARAM NOF/U: HCPCS | Performed by: PSYCHIATRY & NEUROLOGY

## 2024-01-12 RX ORDER — MEMANTINE HYDROCHLORIDE 10 MG/1
10 TABLET ORAL 2 TIMES DAILY
Qty: 60 TABLET | Refills: 5 | Status: SHIPPED | OUTPATIENT
Start: 2024-01-12

## 2024-01-12 RX ORDER — MEMANTINE HYDROCHLORIDE 5 MG/1
TABLET ORAL
Qty: 42 TABLET | Refills: 0 | Status: SHIPPED | OUTPATIENT
Start: 2024-01-12

## 2024-01-19 ENCOUNTER — HOSPITAL ENCOUNTER (OUTPATIENT)
Dept: MRI IMAGING | Age: 79
Discharge: HOME | End: 2024-01-19
Attending: PSYCHIATRY & NEUROLOGY
Payer: MEDICARE

## 2024-01-19 DIAGNOSIS — F03.C0 SEVERE DEMENTIA, UNSPECIFIED DEMENTIA TYPE, UNSPECIFIED WHETHER BEHAVIORAL, PSYCHOTIC, OR MOOD DISTURBANCE OR ANXIETY (HCC): ICD-10-CM

## 2024-01-19 DIAGNOSIS — R47.01 RECEPTIVE APHASIA: ICD-10-CM

## 2024-01-19 DIAGNOSIS — I63.9 ISCHEMIC STROKE (HCC): ICD-10-CM

## 2024-01-19 DIAGNOSIS — R48.2 APRAXIA: ICD-10-CM

## 2024-01-19 PROCEDURE — 70553 MRI BRAIN STEM W/O & W/DYE: CPT

## 2024-01-19 PROCEDURE — 6360000004 HC RX CONTRAST MEDICATION: Performed by: PSYCHIATRY & NEUROLOGY

## 2024-01-19 PROCEDURE — A9579 GAD-BASE MR CONTRAST NOS,1ML: HCPCS | Performed by: PSYCHIATRY & NEUROLOGY

## 2024-01-19 RX ADMIN — GADOTERIDOL 20 ML: 279.3 INJECTION, SOLUTION INTRAVENOUS at 10:58

## 2024-01-22 ENCOUNTER — TELEPHONE (OUTPATIENT)
Dept: NEUROLOGY | Age: 79
End: 2024-01-22

## 2024-01-22 NOTE — PROGRESS NOTES
MG tablet Take 3 tablets by mouth nightly as needed for Pain (Patient not taking: Reported on 1/12/2024)      Multiple Vitamins-Minerals (MULTIVITAMIN MEN 50+ PO) Take 1 tablet by mouth daily (Patient not taking: Reported on 1/12/2024)       No current facility-administered medications for this visit.       Past Medical History:   Diagnosis Date    Acute CVA (cerebrovascular accident) (HCC) 10/7/2022    UTI (urinary tract infection)        Past Surgical History:   Procedure Laterality Date    APPENDECTOMY      HERNIA REPAIR      TURP N/A 2/21/2022    CYSTOSCOPY TRANSURETHRAL RESECTION PROSTATE performed by Neo Car MD at [facility] OR        Social History     Socioeconomic History    Marital status:    Tobacco Use    Smoking status: Never    Smokeless tobacco: Never   Vaping Use    Vaping Use: Never used   Substance and Sexual Activity    Alcohol use: Not Currently    Drug use: Never    Sexual activity: Not Currently     Partners: Female       Family History   Problem Relation Age of Onset    No Known Problems Mother     No Known Problems Father     Cancer Maternal Grandfather        Objective    Physical Exam:  Also present during visit: wife.    Constitutional   Weight: well nourished  Heart/Vascular   Rate and Rhythm: RRR   Murmurs: none   Arterial Pulses:  no carotid bruits  Neck   Appearance/Palpation/Auscultation: supple  Mental Status   Orientation: oriented to person, disoriented to place, disoriented to problem, and disoriented to time   Mood/Affect: appropriate mood and appropriate affect   Memory/Other: attention span normal, concentration normal, recent memory impaired, remote memory impaired, and reduced fund of knowledge    MMSE TOTAL =5/28    Language   Language: (normal) language, no dysarthria, (normal) articulation, and no dysphasia/aphasia  Cranial Nerves   CN II Right: visual field defect: homonymous hemianopia right   CN II Left: visual field defect: homonymous hemianopia right   CN

## 2024-02-05 ENCOUNTER — APPOINTMENT (OUTPATIENT)
Dept: GENERAL RADIOLOGY | Age: 79
End: 2024-02-05
Payer: MEDICARE

## 2024-02-05 ENCOUNTER — APPOINTMENT (OUTPATIENT)
Dept: CT IMAGING | Age: 79
End: 2024-02-05
Payer: MEDICARE

## 2024-02-05 ENCOUNTER — HOSPITAL ENCOUNTER (EMERGENCY)
Age: 79
Discharge: HOME | End: 2024-02-05
Attending: EMERGENCY MEDICINE
Payer: MEDICARE

## 2024-02-05 VITALS
OXYGEN SATURATION: 95 % | HEIGHT: 71 IN | RESPIRATION RATE: 18 BRPM | HEART RATE: 69 BPM | TEMPERATURE: 97.4 F | DIASTOLIC BLOOD PRESSURE: 79 MMHG | SYSTOLIC BLOOD PRESSURE: 146 MMHG | BODY MASS INDEX: 27.58 KG/M2 | WEIGHT: 197 LBS

## 2024-02-05 DIAGNOSIS — F03.B0 MODERATE DEMENTIA, UNSPECIFIED DEMENTIA TYPE, UNSPECIFIED WHETHER BEHAVIORAL, PSYCHOTIC, OR MOOD DISTURBANCE OR ANXIETY (HCC): Primary | ICD-10-CM

## 2024-02-05 LAB
ALBUMIN SERPL-MCNC: 4 GM/DL (ref 3.4–5)
ALP BLD-CCNC: 94 IU/L (ref 40–129)
ALT SERPL-CCNC: 7 U/L (ref 10–40)
ANION GAP SERPL CALCULATED.3IONS-SCNC: 14 MMOL/L (ref 7–16)
AST SERPL-CCNC: 17 IU/L (ref 15–37)
BASOPHILS ABSOLUTE: 0 K/CU MM
BASOPHILS RELATIVE PERCENT: 0.4 % (ref 0–1)
BILIRUB SERPL-MCNC: 0.5 MG/DL (ref 0–1)
BILIRUBIN URINE: NEGATIVE MG/DL
BLOOD, URINE: NEGATIVE
BUN SERPL-MCNC: 14 MG/DL (ref 6–23)
CALCIUM SERPL-MCNC: 9.2 MG/DL (ref 8.3–10.6)
CHLORIDE BLD-SCNC: 104 MMOL/L (ref 99–110)
CLARITY: CLEAR
CO2: 26 MMOL/L (ref 21–32)
COLOR: YELLOW
COMMENT UA: NORMAL
CREAT SERPL-MCNC: 1.4 MG/DL (ref 0.9–1.3)
DIFFERENTIAL TYPE: ABNORMAL
EOSINOPHILS ABSOLUTE: 0.2 K/CU MM
EOSINOPHILS RELATIVE PERCENT: 2.3 % (ref 0–3)
GFR SERPL CREATININE-BSD FRML MDRD: 52 ML/MIN/1.73M2
GLUCOSE SERPL-MCNC: 137 MG/DL (ref 70–99)
GLUCOSE, URINE: NEGATIVE MG/DL
HCT VFR BLD CALC: 47.1 % (ref 42–52)
HEMOGLOBIN: 15.7 GM/DL (ref 13.5–18)
IMMATURE NEUTROPHIL %: 0.1 % (ref 0–0.43)
KETONES, URINE: NEGATIVE MG/DL
LACTATE: 2.1 MMOL/L (ref 0.5–1.9)
LEUKOCYTE ESTERASE, URINE: NEGATIVE
LYMPHOCYTES ABSOLUTE: 3.2 K/CU MM
LYMPHOCYTES RELATIVE PERCENT: 39.3 % (ref 24–44)
MCH RBC QN AUTO: 31.1 PG (ref 27–31)
MCHC RBC AUTO-ENTMCNC: 33.3 % (ref 32–36)
MCV RBC AUTO: 93.3 FL (ref 78–100)
MONOCYTES ABSOLUTE: 0.8 K/CU MM
MONOCYTES RELATIVE PERCENT: 9.3 % (ref 0–4)
NITRITE URINE, QUANTITATIVE: NEGATIVE
PDW BLD-RTO: 13.3 % (ref 11.7–14.9)
PH, URINE: 6 (ref 5–8)
PLATELET # BLD: 249 K/CU MM (ref 140–440)
PMV BLD AUTO: 10.4 FL (ref 7.5–11.1)
POTASSIUM SERPL-SCNC: 4.3 MMOL/L (ref 3.5–5.1)
PROTEIN UA: NEGATIVE MG/DL
RBC # BLD: 5.05 M/CU MM (ref 4.6–6.2)
SEGMENTED NEUTROPHILS ABSOLUTE COUNT: 4 K/CU MM
SEGMENTED NEUTROPHILS RELATIVE PERCENT: 48.6 % (ref 36–66)
SODIUM BLD-SCNC: 144 MMOL/L (ref 135–145)
SPECIFIC GRAVITY UA: 1.02 (ref 1–1.03)
TOTAL IMMATURE NEUTOROPHIL: 0.01 K/CU MM
TOTAL PROTEIN: 7 GM/DL (ref 6.4–8.2)
TSH SERPL DL<=0.005 MIU/L-ACNC: 3.33 UIU/ML (ref 0.27–4.2)
UROBILINOGEN, URINE: 0.2 MG/DL (ref 0.2–1)
WBC # BLD: 8.2 K/CU MM (ref 4–10.5)

## 2024-02-05 PROCEDURE — 99285 EMERGENCY DEPT VISIT HI MDM: CPT

## 2024-02-05 PROCEDURE — 85025 COMPLETE CBC W/AUTO DIFF WBC: CPT

## 2024-02-05 PROCEDURE — 83605 ASSAY OF LACTIC ACID: CPT

## 2024-02-05 PROCEDURE — 93005 ELECTROCARDIOGRAM TRACING: CPT | Performed by: EMERGENCY MEDICINE

## 2024-02-05 PROCEDURE — 70450 CT HEAD/BRAIN W/O DYE: CPT

## 2024-02-05 PROCEDURE — 81003 URINALYSIS AUTO W/O SCOPE: CPT

## 2024-02-05 PROCEDURE — 80053 COMPREHEN METABOLIC PANEL: CPT

## 2024-02-05 PROCEDURE — 71045 X-RAY EXAM CHEST 1 VIEW: CPT

## 2024-02-05 PROCEDURE — 84443 ASSAY THYROID STIM HORMONE: CPT

## 2024-02-05 ASSESSMENT — LIFESTYLE VARIABLES
HOW MANY STANDARD DRINKS CONTAINING ALCOHOL DO YOU HAVE ON A TYPICAL DAY: PATIENT DOES NOT DRINK
HOW OFTEN DO YOU HAVE A DRINK CONTAINING ALCOHOL: NEVER

## 2024-02-05 ASSESSMENT — PAIN - FUNCTIONAL ASSESSMENT: PAIN_FUNCTIONAL_ASSESSMENT: NONE - DENIES PAIN

## 2024-02-05 NOTE — DISCHARGE INSTRUCTIONS
Follow-up with neurologist Dr. Talbot in 1 to 2 days for reevaluation.  Call for an appointment  Follow-up with your primary care physician Dr. Dunham in 1 to 2 days for reevaluation.  Call for an appointment  Return to the emergency department immediately any pain fever chills nausea vomiting dizzy lightheadedness or any worsening symptoms.

## 2024-02-05 NOTE — ED PROVIDER NOTES
Emergency Department Encounter    Patient: Steve Humphries  MRN: [id number]  : 1945  Date of Evaluation: 2024  ED Provider:  Opal Gill DO    Triage Chief Complaint:   Altered Mental Status (Pt brought in by [facility] for confusion. Pt has hx of dementia. Pt didn't believe he was at home where he has lived the past 30 years. Pt wife called EMS believe her. )    [place]:  Steve Humphries is a 78 y.o. male with history of BPH, chronic stage III kidney disease, CVA, ataxia, right homonymous hemianopsia, aphasia, UTI, dementia that presents to the emergency department via EMS from home for altered mental status.  Wife states patient has been declining since this past December.  She states she noticed in December he did have some difficulty with his close on getting the toothpaste of the tube and just little things and difficulty remembering.  She states she did not have it evaluated at that time.  She states a couple weeks ago they seen a neurologist and patient was diagnosed with severe dementia.  She states they see the neurologist Dr. Talbot.  She states he also had a MRI done recently.  She states she has not been to the doctor office for any results yet.  She states today they went to [employer] and patient came back to the house and states this was not his house this is not his garage does not his dogs.  He states he does not remember anything.  She states they have been together for years.  She states she had the ambulance bring to the emergency department for evaluation.  On arrival patient alert to his name but unable to tell me his birthday or the days month or year.  Patient otherwise cooperative able to follow all commands.  Patient poor historian with memory loss.  Patient here for evaluation.    ROS - see HPI, below listed is current ROS at time of my eval:  Review of system obtained from wife since patient with dementia 89 unable to give any history of present illness    Past Medical History:

## 2024-02-05 NOTE — ED NOTES
Discharge instructions reviewed with patient and wife. Follow up was discussed. Patient and wife denies further questions and verbalizes understanding

## 2024-02-05 NOTE — ED TRIAGE NOTES
Pt brought in by [facility] for confusion. Pt has hx of dementia. Pt didn't believe he was at home where he has lived the past 30 years. Pt wife called EMS believe her.

## 2024-02-07 LAB
EKG ATRIAL RATE: 71 BPM
EKG DIAGNOSIS: NORMAL
EKG P AXIS: 36 DEGREES
EKG P-R INTERVAL: 114 MS
EKG Q-T INTERVAL: 404 MS
EKG QRS DURATION: 80 MS
EKG QTC CALCULATION (BAZETT): 439 MS
EKG R AXIS: -4 DEGREES
EKG T AXIS: 28 DEGREES
EKG VENTRICULAR RATE: 71 BPM

## 2024-02-07 PROCEDURE — 93010 ELECTROCARDIOGRAM REPORT: CPT | Performed by: INTERNAL MEDICINE

## 2024-02-13 ENCOUNTER — HOSPITAL ENCOUNTER (OUTPATIENT)
Dept: CT IMAGING | Age: 79
Discharge: HOME | End: 2024-02-13
Payer: MEDICARE

## 2024-02-13 DIAGNOSIS — I63.9 ISCHEMIC STROKE (HCC): ICD-10-CM

## 2024-02-13 PROCEDURE — 6360000004 HC RX CONTRAST MEDICATION: Performed by: PSYCHIATRY & NEUROLOGY

## 2024-02-13 PROCEDURE — 70496 CT ANGIOGRAPHY HEAD: CPT

## 2024-02-13 RX ADMIN — IOPAMIDOL 75 ML: 755 INJECTION, SOLUTION INTRAVENOUS at 12:14

## 2024-02-15 ENCOUNTER — TELEPHONE (OUTPATIENT)
Age: 79
End: 2024-02-15

## 2024-02-15 ENCOUNTER — OFFICE VISIT (OUTPATIENT)
Dept: NEUROLOGY | Age: 79
End: 2024-02-15
Payer: MEDICARE

## 2024-02-15 VITALS
HEIGHT: 71 IN | SYSTOLIC BLOOD PRESSURE: 122 MMHG | HEART RATE: 70 BPM | WEIGHT: 188 LBS | DIASTOLIC BLOOD PRESSURE: 76 MMHG | OXYGEN SATURATION: 95 % | BODY MASS INDEX: 26.32 KG/M2

## 2024-02-15 DIAGNOSIS — I63.9 CARDIOEMBOLIC STROKE (HCC): ICD-10-CM

## 2024-02-15 DIAGNOSIS — G30.9 ALZHEIMER'S DISEASE, UNSPECIFIED (CODE) (HCC): Primary | ICD-10-CM

## 2024-02-15 DIAGNOSIS — I65.23 CAROTID STENOSIS, BILATERAL: ICD-10-CM

## 2024-02-15 DIAGNOSIS — I63.9 ISCHEMIC STROKE (HCC): ICD-10-CM

## 2024-02-15 PROCEDURE — G8484 FLU IMMUNIZE NO ADMIN: HCPCS | Performed by: PSYCHIATRY & NEUROLOGY

## 2024-02-15 PROCEDURE — 99214 OFFICE O/P EST MOD 30 MIN: CPT | Performed by: PSYCHIATRY & NEUROLOGY

## 2024-02-15 PROCEDURE — G8427 DOCREV CUR MEDS BY ELIG CLIN: HCPCS | Performed by: PSYCHIATRY & NEUROLOGY

## 2024-02-15 PROCEDURE — 1036F TOBACCO NON-USER: CPT | Performed by: PSYCHIATRY & NEUROLOGY

## 2024-02-15 PROCEDURE — 1123F ACP DISCUSS/DSCN MKR DOCD: CPT | Performed by: PSYCHIATRY & NEUROLOGY

## 2024-02-15 PROCEDURE — G8419 CALC BMI OUT NRM PARAM NOF/U: HCPCS | Performed by: PSYCHIATRY & NEUROLOGY

## 2024-02-15 NOTE — TELEPHONE ENCOUNTER
Received a referral from Dr Brad Talbot for bilateral carotid stenosis and ischemic stroke.     Spoke to patient's spouse Monalisa (HIPAA). Appointment scheduled with Veronique SANZ on Thursday, 2/29/24 at 10 am. Monalisa states she knows where our office is and does not need directions or a reminder letter. She is a patient of Veronique's. Nothing further needed.

## 2024-02-16 ENCOUNTER — TELEPHONE (OUTPATIENT)
Dept: NEUROLOGY | Age: 79
End: 2024-02-16

## 2024-02-16 NOTE — TELEPHONE ENCOUNTER
Patients spouse called stating order for echocardiogram did not have a diagnosis that was insurance acceptable

## 2024-02-23 NOTE — TELEPHONE ENCOUNTER
I contacted patient's spouse to see if I could get denial letter.  She stated it was our central scheduling who would not proceed d/t the dx code would not be covered by [employer].  I contacted central scheduling and the reply was that the ICD I63.9 would not be accepted by [employer] for this procedure so therefore they could not schedule.

## 2024-02-23 NOTE — TELEPHONE ENCOUNTER
In what world can you not check an echocardiogram for somebody with recurrent strokes…?  Can we please request some sort of formal denial letter so I can see the justification?

## 2024-02-27 NOTE — TELEPHONE ENCOUNTER
Abdomen , soft, nontender, nondistended , no guarding or rigidity , no masses palpable , normal bowel sounds Notified pt wife.

## 2024-02-27 NOTE — TELEPHONE ENCOUNTER
Okay.  I cannot make of diagnoses but I will try 1 more diagnostic codes that I think is somewhat appropriate for the issue that may get approved.  Will change the diagnostic code on the order now.

## 2024-02-29 ENCOUNTER — TELEPHONE (OUTPATIENT)
Age: 79
End: 2024-02-29

## 2024-02-29 NOTE — TELEPHONE ENCOUNTER
Cancellation/No-Show Note    Patient Name:  Steve Humphries  :  1945   Date:  2024  Cancelled visits to date: 1  No-shows to date: 0    For appointment 24 @ 10 am with Veronique SANZ, patient:                      [x]  Cancelled                                                   []  Rescheduled appointment                       []  No show                                                                                                                                            Reason given by patient:  []  No reason given  []  Conflicting appointment  []  No transportation  []  Conflict with work  []  Illness  []  Inclement weather   []  Insurance related issues  [x]  Other:                                                                        Comments: Monalisa (spouse) states patient very uncooperative since his stroke, refused to get out of bed for appointment today. Monalisa states she will back to reschedule the appointment at a later date.       Electronically signed by: Enedina Mitchell LPN on 2024 at 9:49 AM

## 2024-03-03 ENCOUNTER — APPOINTMENT (OUTPATIENT)
Dept: GENERAL RADIOLOGY | Age: 79
End: 2024-03-03
Payer: MEDICARE

## 2024-03-03 ENCOUNTER — APPOINTMENT (OUTPATIENT)
Dept: CT IMAGING | Age: 79
End: 2024-03-03
Payer: MEDICARE

## 2024-03-03 ENCOUNTER — HOSPITAL ENCOUNTER (INPATIENT)
Age: 79
LOS: 5 days | Discharge: OTHER INSTITUTION | End: 2024-03-08
Attending: STUDENT IN AN ORGANIZED HEALTH CARE EDUCATION/TRAINING PROGRAM
Payer: MEDICARE

## 2024-03-03 DIAGNOSIS — J18.9 PNEUMONIA DUE TO INFECTIOUS ORGANISM, UNSPECIFIED LATERALITY, UNSPECIFIED PART OF LUNG: ICD-10-CM

## 2024-03-03 DIAGNOSIS — R29.898 WEAKNESS OF BOTH LOWER EXTREMITIES: Primary | ICD-10-CM

## 2024-03-03 DIAGNOSIS — J96.01 ACUTE RESPIRATORY FAILURE WITH HYPOXIA (HCC): ICD-10-CM

## 2024-03-03 DIAGNOSIS — A41.9 SEPTICEMIA (HCC): ICD-10-CM

## 2024-03-03 PROBLEM — J16.0 CAP (COMMUNITY ACQUIRED PNEUMONIA) DUE TO CHLAMYDIA SPECIES: Status: ACTIVE | Noted: 2024-03-03

## 2024-03-03 LAB
ALBUMIN SERPL-MCNC: 3.8 GM/DL (ref 3.4–5)
ALP BLD-CCNC: 100 IU/L (ref 40–129)
ALT SERPL-CCNC: 12 U/L (ref 10–40)
ANION GAP SERPL CALCULATED.3IONS-SCNC: 13 MMOL/L (ref 7–16)
ANION GAP SERPL CALCULATED.3IONS-SCNC: 15 MMOL/L (ref 7–16)
AST SERPL-CCNC: 28 IU/L (ref 15–37)
BACTERIA: NEGATIVE /HPF
BASE EXCESS MIXED: 1 (ref 0–3)
BASOPHILS ABSOLUTE: 0 K/CU MM
BASOPHILS ABSOLUTE: 0 K/CU MM
BASOPHILS RELATIVE PERCENT: 0.3 % (ref 0–1)
BASOPHILS RELATIVE PERCENT: 0.3 % (ref 0–1)
BILIRUB SERPL-MCNC: 0.8 MG/DL (ref 0–1)
BILIRUBIN URINE: NEGATIVE MG/DL
BLOOD, URINE: ABNORMAL
BUN SERPL-MCNC: 16 MG/DL (ref 6–23)
BUN SERPL-MCNC: 16 MG/DL (ref 6–23)
CALCIUM SERPL-MCNC: 8.5 MG/DL (ref 8.3–10.6)
CALCIUM SERPL-MCNC: 9.6 MG/DL (ref 8.3–10.6)
CHLORIDE BLD-SCNC: 102 MMOL/L (ref 99–110)
CHLORIDE BLD-SCNC: 104 MMOL/L (ref 99–110)
CLARITY: CLEAR
CO2: 20 MMOL/L (ref 21–32)
CO2: 20 MMOL/L (ref 21–32)
COLOR: YELLOW
COMMENT: ABNORMAL
CREAT SERPL-MCNC: 1.1 MG/DL (ref 0.9–1.3)
CREAT SERPL-MCNC: 1.1 MG/DL (ref 0.9–1.3)
D DIMER: 1.41 UG/ML (FEU)
DIFFERENTIAL TYPE: ABNORMAL
DIFFERENTIAL TYPE: ABNORMAL
EKG ATRIAL RATE: 97 BPM
EKG DIAGNOSIS: NORMAL
EKG P AXIS: 32 DEGREES
EKG P-R INTERVAL: 114 MS
EKG Q-T INTERVAL: 364 MS
EKG QRS DURATION: 74 MS
EKG QTC CALCULATION (BAZETT): 462 MS
EKG R AXIS: -16 DEGREES
EKG T AXIS: -1 DEGREES
EKG VENTRICULAR RATE: 97 BPM
EOSINOPHILS ABSOLUTE: 0.1 K/CU MM
EOSINOPHILS ABSOLUTE: 0.1 K/CU MM
EOSINOPHILS RELATIVE PERCENT: 0.5 % (ref 0–3)
EOSINOPHILS RELATIVE PERCENT: 0.7 % (ref 0–3)
FOLATE SERPL-MCNC: 10.2 NG/ML (ref 3.1–17.5)
GFR SERPL CREATININE-BSD FRML MDRD: >60 ML/MIN/1.73M2
GFR SERPL CREATININE-BSD FRML MDRD: >60 ML/MIN/1.73M2
GLUCOSE BLD-MCNC: 94 MG/DL (ref 70–99)
GLUCOSE SERPL-MCNC: 129 MG/DL (ref 70–99)
GLUCOSE SERPL-MCNC: 96 MG/DL (ref 70–99)
GLUCOSE, URINE: NEGATIVE MG/DL
HCO3 VENOUS: 27.9 MMOL/L (ref 22–29)
HCT VFR BLD CALC: 43.2 % (ref 42–52)
HCT VFR BLD CALC: 48.3 % (ref 42–52)
HEMOGLOBIN: 13.6 GM/DL (ref 13.5–18)
HEMOGLOBIN: 15.8 GM/DL (ref 13.5–18)
IMMATURE NEUTROPHIL %: 0.4 % (ref 0–0.43)
IMMATURE NEUTROPHIL %: 0.4 % (ref 0–0.43)
INFLUENZA A ANTIGEN: NOT DETECTED
INFLUENZA B ANTIGEN: NOT DETECTED
INR BLD: 1.1 INDEX
KETONES, URINE: 40 MG/DL
LACTATE: 1.9 MMOL/L (ref 0.5–1.9)
LACTIC ACID, SEPSIS: 1.5 MMOL/L (ref 0.4–2)
LACTIC ACID, SEPSIS: 2 MMOL/L (ref 0.4–2)
LEUKOCYTE ESTERASE, URINE: NEGATIVE
LYMPHOCYTES ABSOLUTE: 3.5 K/CU MM
LYMPHOCYTES ABSOLUTE: 3.5 K/CU MM
LYMPHOCYTES RELATIVE PERCENT: 25.9 % (ref 24–44)
LYMPHOCYTES RELATIVE PERCENT: 29.3 % (ref 24–44)
MCH RBC QN AUTO: 30.2 PG (ref 27–31)
MCH RBC QN AUTO: 30.9 PG (ref 27–31)
MCHC RBC AUTO-ENTMCNC: 31.5 % (ref 32–36)
MCHC RBC AUTO-ENTMCNC: 32.7 % (ref 32–36)
MCV RBC AUTO: 94.3 FL (ref 78–100)
MCV RBC AUTO: 96 FL (ref 78–100)
MONOCYTES ABSOLUTE: 0.9 K/CU MM
MONOCYTES ABSOLUTE: 1 K/CU MM
MONOCYTES RELATIVE PERCENT: 7.2 % (ref 0–4)
MONOCYTES RELATIVE PERCENT: 7.4 % (ref 0–4)
MUCUS: ABNORMAL HPF
NITRITE URINE, QUANTITATIVE: NEGATIVE
NUCLEATED RBC %: 0 %
NUCLEATED RBC %: 0 %
O2 SAT, VEN: 59.9 % (ref 50–70)
PCO2, VEN: 53 MMHG (ref 41–51)
PDW BLD-RTO: 12.6 % (ref 11.7–14.9)
PDW BLD-RTO: 12.8 % (ref 11.7–14.9)
PH VENOUS: 7.33 (ref 7.32–7.43)
PH, URINE: 5.5 (ref 5–8)
PLATELET # BLD: 295 K/CU MM (ref 140–440)
PLATELET # BLD: 345 K/CU MM (ref 140–440)
PMV BLD AUTO: 9.2 FL (ref 7.5–11.1)
PMV BLD AUTO: 9.6 FL (ref 7.5–11.1)
PO2, VEN: 32 MMHG (ref 28–48)
POTASSIUM SERPL-SCNC: 4 MMOL/L (ref 3.5–5.1)
POTASSIUM SERPL-SCNC: 4.5 MMOL/L (ref 3.5–5.1)
PRO-BNP: 272.8 PG/ML
PROTEIN UA: NEGATIVE MG/DL
PROTHROMBIN TIME: 14.4 SECONDS (ref 11.7–14.5)
RBC # BLD: 4.5 M/CU MM (ref 4.6–6.2)
RBC # BLD: 5.12 M/CU MM (ref 4.6–6.2)
RBC URINE: 9 /HPF (ref 0–3)
SARS-COV-2 RDRP RESP QL NAA+PROBE: NOT DETECTED
SEGMENTED NEUTROPHILS ABSOLUTE COUNT: 7.5 K/CU MM
SEGMENTED NEUTROPHILS ABSOLUTE COUNT: 8.8 K/CU MM
SEGMENTED NEUTROPHILS RELATIVE PERCENT: 62.1 % (ref 36–66)
SEGMENTED NEUTROPHILS RELATIVE PERCENT: 65.5 % (ref 36–66)
SODIUM BLD-SCNC: 137 MMOL/L (ref 135–145)
SODIUM BLD-SCNC: 137 MMOL/L (ref 135–145)
SOURCE: NORMAL
SPECIFIC GRAVITY UA: 1.02 (ref 1–1.03)
SQUAMOUS EPITHELIAL: 1 /HPF
TOTAL CK: 565 IU/L (ref 38–174)
TOTAL IMMATURE NEUTOROPHIL: 0.05 K/CU MM
TOTAL IMMATURE NEUTOROPHIL: 0.05 K/CU MM
TOTAL NUCLEATED RBC: 0 K/CU MM
TOTAL NUCLEATED RBC: 0 K/CU MM
TOTAL PROTEIN: 7.7 GM/DL (ref 6.4–8.2)
TRICHOMONAS: ABNORMAL /HPF
TROPONIN, HIGH SENSITIVITY: 14 NG/L (ref 0–22)
TROPONIN, HIGH SENSITIVITY: 17 NG/L (ref 0–22)
TSH SERPL DL<=0.005 MIU/L-ACNC: 3.16 UIU/ML (ref 0.27–4.2)
UROBILINOGEN, URINE: 0.2 MG/DL (ref 0.2–1)
VITAMIN B-12: 458.4 PG/ML (ref 211–911)
WBC # BLD: 12.1 K/CU MM (ref 4–10.5)
WBC # BLD: 13.5 K/CU MM (ref 4–10.5)
WBC UA: 10 /HPF (ref 0–2)

## 2024-03-03 PROCEDURE — 93010 ELECTROCARDIOGRAM REPORT: CPT | Performed by: INTERNAL MEDICINE

## 2024-03-03 PROCEDURE — 87040 BLOOD CULTURE FOR BACTERIA: CPT

## 2024-03-03 PROCEDURE — 82550 ASSAY OF CK (CPK): CPT

## 2024-03-03 PROCEDURE — 83880 ASSAY OF NATRIURETIC PEPTIDE: CPT

## 2024-03-03 PROCEDURE — 82607 VITAMIN B-12: CPT

## 2024-03-03 PROCEDURE — 71045 X-RAY EXAM CHEST 1 VIEW: CPT

## 2024-03-03 PROCEDURE — 93005 ELECTROCARDIOGRAM TRACING: CPT | Performed by: STUDENT IN AN ORGANIZED HEALTH CARE EDUCATION/TRAINING PROGRAM

## 2024-03-03 PROCEDURE — 87086 URINE CULTURE/COLONY COUNT: CPT

## 2024-03-03 PROCEDURE — 85025 COMPLETE CBC W/AUTO DIFF WBC: CPT

## 2024-03-03 PROCEDURE — 36415 COLL VENOUS BLD VENIPUNCTURE: CPT

## 2024-03-03 PROCEDURE — 85610 PROTHROMBIN TIME: CPT

## 2024-03-03 PROCEDURE — 80048 BASIC METABOLIC PNL TOTAL CA: CPT

## 2024-03-03 PROCEDURE — 85379 FIBRIN DEGRADATION QUANT: CPT

## 2024-03-03 PROCEDURE — 87502 INFLUENZA DNA AMP PROBE: CPT

## 2024-03-03 PROCEDURE — 2580000003 HC RX 258: Performed by: STUDENT IN AN ORGANIZED HEALTH CARE EDUCATION/TRAINING PROGRAM

## 2024-03-03 PROCEDURE — 80053 COMPREHEN METABOLIC PANEL: CPT

## 2024-03-03 PROCEDURE — 6360000004 HC RX CONTRAST MEDICATION: Performed by: STUDENT IN AN ORGANIZED HEALTH CARE EDUCATION/TRAINING PROGRAM

## 2024-03-03 PROCEDURE — 6370000000 HC RX 637 (ALT 250 FOR IP): Performed by: STUDENT IN AN ORGANIZED HEALTH CARE EDUCATION/TRAINING PROGRAM

## 2024-03-03 PROCEDURE — 1200000000 HC SEMI PRIVATE

## 2024-03-03 PROCEDURE — 83605 ASSAY OF LACTIC ACID: CPT

## 2024-03-03 PROCEDURE — 70450 CT HEAD/BRAIN W/O DYE: CPT

## 2024-03-03 PROCEDURE — 99285 EMERGENCY DEPT VISIT HI MDM: CPT

## 2024-03-03 PROCEDURE — 6360000002 HC RX W HCPCS: Performed by: STUDENT IN AN ORGANIZED HEALTH CARE EDUCATION/TRAINING PROGRAM

## 2024-03-03 PROCEDURE — 70498 CT ANGIOGRAPHY NECK: CPT

## 2024-03-03 PROCEDURE — 84484 ASSAY OF TROPONIN QUANT: CPT

## 2024-03-03 PROCEDURE — 87150 DNA/RNA AMPLIFIED PROBE: CPT

## 2024-03-03 PROCEDURE — 71275 CT ANGIOGRAPHY CHEST: CPT

## 2024-03-03 PROCEDURE — 81001 URINALYSIS AUTO W/SCOPE: CPT

## 2024-03-03 PROCEDURE — 87635 SARS-COV-2 COVID-19 AMP PRB: CPT

## 2024-03-03 PROCEDURE — 82962 GLUCOSE BLOOD TEST: CPT

## 2024-03-03 PROCEDURE — 2700000000 HC OXYGEN THERAPY PER DAY

## 2024-03-03 PROCEDURE — 82746 ASSAY OF FOLIC ACID SERUM: CPT

## 2024-03-03 PROCEDURE — 82805 BLOOD GASES W/O2 SATURATION: CPT

## 2024-03-03 PROCEDURE — 96365 THER/PROPH/DIAG IV INF INIT: CPT

## 2024-03-03 PROCEDURE — 84443 ASSAY THYROID STIM HORMONE: CPT

## 2024-03-03 RX ORDER — SODIUM CHLORIDE 9 MG/ML
INJECTION, SOLUTION INTRAVENOUS PRN
Status: DISCONTINUED | OUTPATIENT
Start: 2024-03-03 | End: 2024-03-08 | Stop reason: HOSPADM

## 2024-03-03 RX ORDER — ASPIRIN 325 MG
325 TABLET ORAL ONCE
Status: COMPLETED | OUTPATIENT
Start: 2024-03-03 | End: 2024-03-03

## 2024-03-03 RX ORDER — ENOXAPARIN SODIUM 100 MG/ML
40 INJECTION SUBCUTANEOUS DAILY
Status: DISCONTINUED | OUTPATIENT
Start: 2024-03-03 | End: 2024-03-08 | Stop reason: HOSPADM

## 2024-03-03 RX ORDER — 0.9 % SODIUM CHLORIDE 0.9 %
30 INTRAVENOUS SOLUTION INTRAVENOUS ONCE
Status: COMPLETED | OUTPATIENT
Start: 2024-03-03 | End: 2024-03-03

## 2024-03-03 RX ORDER — SODIUM CHLORIDE 0.9 % (FLUSH) 0.9 %
5-40 SYRINGE (ML) INJECTION PRN
Status: DISCONTINUED | OUTPATIENT
Start: 2024-03-03 | End: 2024-03-08 | Stop reason: HOSPADM

## 2024-03-03 RX ORDER — ONDANSETRON 4 MG/1
4 TABLET, ORALLY DISINTEGRATING ORAL EVERY 8 HOURS PRN
Status: DISCONTINUED | OUTPATIENT
Start: 2024-03-03 | End: 2024-03-08 | Stop reason: HOSPADM

## 2024-03-03 RX ORDER — ACETAMINOPHEN 325 MG/1
650 TABLET ORAL EVERY 6 HOURS PRN
Status: DISCONTINUED | OUTPATIENT
Start: 2024-03-03 | End: 2024-03-08 | Stop reason: HOSPADM

## 2024-03-03 RX ORDER — POTASSIUM CHLORIDE 20 MEQ/1
40 TABLET, EXTENDED RELEASE ORAL PRN
Status: DISCONTINUED | OUTPATIENT
Start: 2024-03-03 | End: 2024-03-08 | Stop reason: HOSPADM

## 2024-03-03 RX ORDER — MAGNESIUM SULFATE IN WATER 40 MG/ML
2000 INJECTION, SOLUTION INTRAVENOUS PRN
Status: DISCONTINUED | OUTPATIENT
Start: 2024-03-03 | End: 2024-03-08 | Stop reason: HOSPADM

## 2024-03-03 RX ORDER — ASPIRIN 81 MG/1
81 TABLET, CHEWABLE ORAL DAILY
Status: DISCONTINUED | OUTPATIENT
Start: 2024-03-04 | End: 2024-03-08 | Stop reason: HOSPADM

## 2024-03-03 RX ORDER — POTASSIUM CHLORIDE 7.45 MG/ML
10 INJECTION INTRAVENOUS PRN
Status: DISCONTINUED | OUTPATIENT
Start: 2024-03-03 | End: 2024-03-08 | Stop reason: HOSPADM

## 2024-03-03 RX ORDER — SODIUM CHLORIDE 0.9 % (FLUSH) 0.9 %
5-40 SYRINGE (ML) INJECTION EVERY 12 HOURS SCHEDULED
Status: DISCONTINUED | OUTPATIENT
Start: 2024-03-03 | End: 2024-03-08 | Stop reason: HOSPADM

## 2024-03-03 RX ORDER — POLYETHYLENE GLYCOL 3350 17 G/17G
17 POWDER, FOR SOLUTION ORAL DAILY PRN
Status: DISCONTINUED | OUTPATIENT
Start: 2024-03-03 | End: 2024-03-08 | Stop reason: HOSPADM

## 2024-03-03 RX ORDER — SODIUM CHLORIDE 9 MG/ML
INJECTION, SOLUTION INTRAVENOUS CONTINUOUS
Status: DISPENSED | OUTPATIENT
Start: 2024-03-03 | End: 2024-03-04

## 2024-03-03 RX ORDER — ONDANSETRON 2 MG/ML
4 INJECTION INTRAMUSCULAR; INTRAVENOUS EVERY 6 HOURS PRN
Status: DISCONTINUED | OUTPATIENT
Start: 2024-03-03 | End: 2024-03-08 | Stop reason: HOSPADM

## 2024-03-03 RX ORDER — ACETAMINOPHEN 650 MG/1
650 SUPPOSITORY RECTAL EVERY 6 HOURS PRN
Status: DISCONTINUED | OUTPATIENT
Start: 2024-03-03 | End: 2024-03-08 | Stop reason: HOSPADM

## 2024-03-03 RX ORDER — MEMANTINE HYDROCHLORIDE 10 MG/1
10 TABLET ORAL 2 TIMES DAILY
Status: DISCONTINUED | OUTPATIENT
Start: 2024-03-03 | End: 2024-03-08 | Stop reason: HOSPADM

## 2024-03-03 RX ORDER — ATORVASTATIN CALCIUM 40 MG/1
40 TABLET, FILM COATED ORAL NIGHTLY
Status: DISCONTINUED | OUTPATIENT
Start: 2024-03-04 | End: 2024-03-08 | Stop reason: HOSPADM

## 2024-03-03 RX ADMIN — ENOXAPARIN SODIUM 40 MG: 100 INJECTION SUBCUTANEOUS at 18:35

## 2024-03-03 RX ADMIN — MEMANTINE 10 MG: 10 TABLET ORAL at 21:31

## 2024-03-03 RX ADMIN — IOPAMIDOL 80 ML: 755 INJECTION, SOLUTION INTRAVENOUS at 16:36

## 2024-03-03 RX ADMIN — SODIUM CHLORIDE 2559 ML: 9 INJECTION, SOLUTION INTRAVENOUS at 15:40

## 2024-03-03 RX ADMIN — CEFTRIAXONE 1000 MG: 1 INJECTION, POWDER, FOR SOLUTION INTRAMUSCULAR; INTRAVENOUS at 13:12

## 2024-03-03 RX ADMIN — SODIUM CHLORIDE: 9 INJECTION, SOLUTION INTRAVENOUS at 18:08

## 2024-03-03 RX ADMIN — ASPIRIN 325 MG: 325 TABLET ORAL at 15:35

## 2024-03-03 RX ADMIN — IOPAMIDOL 75 ML: 755 INJECTION, SOLUTION INTRAVENOUS at 14:18

## 2024-03-03 ASSESSMENT — PAIN - FUNCTIONAL ASSESSMENT: PAIN_FUNCTIONAL_ASSESSMENT: NONE - DENIES PAIN

## 2024-03-03 NOTE — ED PROVIDER NOTES
IMAGING / EKG):  Orders Placed This Encounter   Procedures    COVID-19, Rapid    Influenza A/B, Molecular    Blood Culture 1    Blood Culture 2    Culture, Urine    CT HEAD WO CONTRAST    CTA HEAD NECK W CONTRAST    XR CHEST PORTABLE    CTA PULMONARY W CONTRAST    CBC with Auto Differential    Comprehensive Metabolic Panel    Protime-INR    Troponin    Brain Natriuretic Peptide    CK    Urinalysis with Reflex to Culture    Lactic Acid    Blood Gas, Venous    D-Dimer, Rapid    Lactate, Sepsis    Urine Microscopic with Reflex to Culture    Diet NPO    NIHSS    Nursing swallow assessment    Telemetry monitoring - 48 hour duration    POCT Glucose    POCT Glucose    EKG 12 Lead    ADMIT TO INPATIENT       MEDICATIONS ORDERED:  Orders Placed This Encounter   Medications    cefTRIAXone (ROCEPHIN) 1,000 mg in sodium chloride 0.9 % 50 mL IVPB (mini-bag)     Order Specific Question:   Antimicrobial Indications     Answer:   Pneumonia (CAP)    iopamidol (ISOVUE-370) 76 % injection 75 mL    aspirin tablet 325 mg    sodium chloride 0.9 % bolus 2,559 mL    iopamidol (ISOVUE-370) 76 % injection 80 mL         PROCEDURES     Procedures    DIAGNOSTIC RESULTS / EMERGENCY DEPARTMENT COURSE / MDM     LAB RESULTS:  Results for orders placed or performed during the hospital encounter of 03/03/24   COVID-19, Rapid    Specimen: Nasopharyngeal   Result Value Ref Range    Source UNKNOWN     SARS-CoV-2, NAAT NOT DETECTED NOT DETECTED   Influenza A/B, Molecular    Specimen: Nasopharyngeal   Result Value Ref Range    Influenza A Antigen NOT DETECTED NOT DETECTED    Influenza B Antigen NOT DETECTED NOT DETECTED   CBC with Auto Differential   Result Value Ref Range    WBC 13.5 (H) 4.0 - 10.5 K/CU MM    RBC 5.12 4.6 - 6.2 M/CU MM    Hemoglobin 15.8 13.5 - 18.0 GM/DL    Hematocrit 48.3 42 - 52 %    MCV 94.3 78 - 100 FL    MCH 30.9 27 - 31 PG    MCHC 32.7 32.0 - 36.0 %    RDW 12.6 11.7 - 14.9 %    Platelets 345 140 - 440 K/CU MM    MPV 9.6 7.5 - 11.1  stroke scale of 6 due to bilateral lower extremity weakness and confusion.    Vital signs are within normal limits with exception of SpO2.  Off oxygen patient desatted all the way down to low 70s.  Resolved with 4 L nasal cannula oxygen.  He is asymptomatic during this time.    Patient has a history of previous strokes.  Per review of outpatient neurology office visit last month was seen for worsening memory issues related to Alzheimer's.  It was also noted in the chart that in \"October of 2022 at [facility] for a left occipital lobe stroke. He still has difficulty with seeing his right visual field. In November of 2023 he suffered a right temporal lobe ischemic stroke.\"    It is unclear whether he has baseline unilateral leg weakness from a previous stroke and now is having a new stroke causing right leg weakness.  Differential includes stroke, pneumonia, syncope, ACS, electrolyte abnormalities, PE, rhabdomyolysis, dehydration among others.  Plan for stroke and cardiac workup.      Wife is now at bedside and provides accurate history.  Stated that usually patient is able to walk however yesterday was crawling on the floor and then when he was too weak with his arms he just laid on the floor.  His wife and 7-year-old granddaughter were unable to get him up off the ground.  She states he never fell.  She does report some nasal congestion but patient has not been coughing.  She voices that she is having trouble taking care of him at home and it may be the time to place him in a long-term facility.        CT head shows moderate atrophy as well as remote previous strokes.  CTA shows bilateral ICA stenosis but no large vessel occlusion.  Also shows severe left vertebral artery stenosis.  Patient was administered an aspirin.    CBC shows a leukocytosis at 13.5, but no anemia, CMP is unremarkable.  COVID and flu are negative.  Chest x-ray showing possible left lower opacity, possible pneumonia.  Initially patient did not meet

## 2024-03-03 NOTE — H&P
V2.0  History and Physical      Name:  Steve Humphries /Age/Sex: 1945  (78 y.o. male)   MRN & CSN:  [id number] & [id number] Encounter Date/Time: 3/3/2024 5:01 PM EST   Location:  [address]- PCP: Janes Rai MD       Hospital Day: 1    Assessment and Plan:     Patient is a 78 y.o. male who presented with bilateral leg weakness and difficulty with ambulation for 2 days    -Bilateral leg weakness: Low suspicion of CVA, CT head and CTA negative for any acute changes patient does have history of ischemic stroke, recent MRI in 2024 showed subacute infarct in right basal ganglia and right occipital lobe, will repeat MRI brain without contrast, PT OT consult, consult neurology if any new stroke found on MRI brain, start statin and aspirin.  Check TSH, B12 and folate    -Severe dementia: Likely Alzheimer's, continue memantine    -Acute hypoxic respiratory failure: Patient was placed on 4 L oxygen by nasal cannula dialysis O2 levels dropped to 85% on room air in the emergency department, during my assessment I weaned him down to room air and saturating more than 95%, patient with no cough or shortness of breath, x-ray chest and CT PE reviewed no focal infiltrate or any evidence of infection will monitor off antibiotics    -Mild rhabdomyolysis: Likely due to being on the floor for more than 12 hours, continue IV fluid    Checklist:  Advanced directive:full  Diet: cardiac  DVT ppx: Lovenox      Disposition: admit to inpatient.  Estimated discharge: 2 day(s).  Current living situation: home.  Expected disposition: SNF    Spoke with ED provider who recommended admission for the patient and I agree with that plan.  Personally reviewed lab studies and imaging.  EKG interpreted personally and results as stated above.  Imaging that was interpreted personally and results as stated above.      Comment: Please note this report has been produced using speech recognition software and may contain errors related to that

## 2024-03-03 NOTE — ED NOTES
ED TO INPATIENT SBAR HANDOFF    Patient Name: Steve Humphries   :  1945  78 y.o.   Preferred Name  Steve  Family/Caregiver Present no   Restraints no   C-SSRS: Risk of Suicide: No Risk  Sitter no   Sepsis Risk Score Sepsis Risk Score: 5.07      Situation  Chief Complaint   Patient presents with    Extremity Weakness     Bilateral leg weakness      Brief Description of Patient's Condition: Pt presents with c/o bilateral leg weakness. Pt lowered himself to the floor to get to the restroom. Pt unable to move. Pt was on the floor since 4PM yesterday. He lives with his wife but she states she was unable to get him up. Pt is alert but his has hx of alzheimers. Pt is newly on 4L of oxygen.    Mental Status: alert  Arrived from: home    Imaging:   CTA HEAD NECK W CONTRAST   Final Result   1. 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   2. Severe stenosis of the left vertebral artery origin.   3. Bilateral proximal internal carotid artery stenoses measuring 70% on the right and 50% on the left.              PROCEDURE: CT ANGIOGRAPHY HEAD WITH/WITHOUT CONTRAST      INDICATION: Fall, altered mental status, bilateral lower extremity weakness.      COMPARISON: none      TECHNIQUE: Axial CT imaging obtained through the head prior to and following administration of IV contrast. Axial images, multiplanar reformatted images, and maximum intensity projection images were reviewed for CT angiographic technique.          FINDINGS:      ANTERIOR CIRCULATION: The intracranial internal carotid arteries, anterior cerebral arteries, and middle cerebral arteries demonstrate no occlusion or stenosis. No evidence for aneurysm or arteriovenous malformation.      POSTERIOR CIRCULATION: The  , basilar artery and posterior cerebral arteries demonstrate no occlusion or stenosis. No evidence for aneurysm or arteriovenous malformation. There is fetal origin of the right

## 2024-03-03 NOTE — PROGRESS NOTES
4 Eyes Skin Assessment     NAME:  Steve Humphries  YOB: 1945  MEDICAL RECORD NUMBER:  [id number]    The patient is being assessed for  Admission    I agree that at least one RN has performed a thorough Head to Toe Skin Assessment on the patient. ALL assessment sites listed below have been assessed.      Areas assessed by both nurses:    Head, Face, Ears, Shoulders, Back, Chest, Arms, Elbows, Hands, Sacrum. Buttock, Coccyx, Ischium, and Legs. Feet and Heels        Does the Patient have a Wound? No noted wound(s)       Nick Prevention initiated by RN: Yes  Wound Care Orders initiated by RN: No    Pressure Injury (Stage 3,4, Unstageable, DTI, NWPT, and Complex wounds) if present, place Wound referral order by RN under : No    New Ostomies, if present place, Ostomy referral order under : No     Nurse 1 eSignature: Electronically signed by Joi Inman LPN on 3/3/24 at 6:18 PM EST    **SHARE this note so that the co-signing nurse can place an eSignature**    Nurse 2 eSignature: Electronically signed by Melanie Khan RN on 3/8/24 at 11:53 AM EST

## 2024-03-03 NOTE — PROGRESS NOTES
Spoke w nurse about MRI order - she will work on screening form w family & it is ok to wait to do study till am

## 2024-03-03 NOTE — ED NOTES
Pts oxygen weaned off to see pts baseline. After a few minutes, pt desated down the 70's. Pt instructed to take deep breaths and placed back onto 6L of oxygen. Pt brought up to 94%.  aware.

## 2024-03-04 ENCOUNTER — APPOINTMENT (OUTPATIENT)
Dept: MRI IMAGING | Age: 79
End: 2024-03-04
Payer: MEDICARE

## 2024-03-04 LAB
ANION GAP SERPL CALCULATED.3IONS-SCNC: 9 MMOL/L (ref 7–16)
ATYPICAL LYMPHOCYTE ABSOLUTE COUNT: ABNORMAL
BUN SERPL-MCNC: 16 MG/DL (ref 6–23)
CALCIUM SERPL-MCNC: 8.1 MG/DL (ref 8.3–10.6)
CHLORIDE BLD-SCNC: 107 MMOL/L (ref 99–110)
CO2: 22 MMOL/L (ref 21–32)
CREAT SERPL-MCNC: 1.1 MG/DL (ref 0.9–1.3)
CULTURE: NORMAL
DIFFERENTIAL TYPE: ABNORMAL
EOSINOPHILS ABSOLUTE: 0.3 K/CU MM
EOSINOPHILS RELATIVE PERCENT: 3 % (ref 0–3)
GFR SERPL CREATININE-BSD FRML MDRD: >60 ML/MIN/1.73M2
GLUCOSE BLD-MCNC: 108 MG/DL (ref 70–99)
GLUCOSE BLD-MCNC: 108 MG/DL (ref 70–99)
GLUCOSE BLD-MCNC: 111 MG/DL (ref 70–99)
GLUCOSE SERPL-MCNC: 100 MG/DL (ref 70–99)
HCT VFR BLD CALC: 41 % (ref 42–52)
HEMOGLOBIN: 13.1 GM/DL (ref 13.5–18)
LACTIC ACID, SEPSIS: 0.9 MMOL/L (ref 0.4–2)
LYMPHOCYTES ABSOLUTE: 5 K/CU MM
LYMPHOCYTES RELATIVE PERCENT: 44 % (ref 24–44)
Lab: NORMAL
MCH RBC QN AUTO: 29.9 PG (ref 27–31)
MCHC RBC AUTO-ENTMCNC: 32 % (ref 32–36)
MCV RBC AUTO: 93.6 FL (ref 78–100)
MONOCYTES ABSOLUTE: 0.8 K/CU MM
MONOCYTES RELATIVE PERCENT: 7 % (ref 0–4)
PDW BLD-RTO: 12.6 % (ref 11.7–14.9)
PLATELET # BLD: 296 K/CU MM (ref 140–440)
PMV BLD AUTO: 9.3 FL (ref 7.5–11.1)
POTASSIUM SERPL-SCNC: 4 MMOL/L (ref 3.5–5.1)
RBC # BLD: 4.38 M/CU MM (ref 4.6–6.2)
SEGMENTED NEUTROPHILS ABSOLUTE COUNT: 5.2 K/CU MM
SEGMENTED NEUTROPHILS RELATIVE PERCENT: 46 % (ref 36–66)
SODIUM BLD-SCNC: 138 MMOL/L (ref 135–145)
SPECIMEN: NORMAL
TOTAL CK: 278 IU/L (ref 38–174)
WBC # BLD: 11.3 K/CU MM (ref 4–10.5)

## 2024-03-04 PROCEDURE — 82550 ASSAY OF CK (CPK): CPT

## 2024-03-04 PROCEDURE — 2580000003 HC RX 258: Performed by: STUDENT IN AN ORGANIZED HEALTH CARE EDUCATION/TRAINING PROGRAM

## 2024-03-04 PROCEDURE — 6370000000 HC RX 637 (ALT 250 FOR IP): Performed by: STUDENT IN AN ORGANIZED HEALTH CARE EDUCATION/TRAINING PROGRAM

## 2024-03-04 PROCEDURE — 83605 ASSAY OF LACTIC ACID: CPT

## 2024-03-04 PROCEDURE — 94761 N-INVAS EAR/PLS OXIMETRY MLT: CPT

## 2024-03-04 PROCEDURE — 80048 BASIC METABOLIC PNL TOTAL CA: CPT

## 2024-03-04 PROCEDURE — 97530 THERAPEUTIC ACTIVITIES: CPT

## 2024-03-04 PROCEDURE — 97116 GAIT TRAINING THERAPY: CPT

## 2024-03-04 PROCEDURE — 97162 PT EVAL MOD COMPLEX 30 MIN: CPT

## 2024-03-04 PROCEDURE — 1200000000 HC SEMI PRIVATE

## 2024-03-04 PROCEDURE — 6360000002 HC RX W HCPCS: Performed by: STUDENT IN AN ORGANIZED HEALTH CARE EDUCATION/TRAINING PROGRAM

## 2024-03-04 PROCEDURE — 36415 COLL VENOUS BLD VENIPUNCTURE: CPT

## 2024-03-04 PROCEDURE — 70551 MRI BRAIN STEM W/O DYE: CPT

## 2024-03-04 PROCEDURE — 82962 GLUCOSE BLOOD TEST: CPT

## 2024-03-04 PROCEDURE — 85007 BL SMEAR W/DIFF WBC COUNT: CPT

## 2024-03-04 PROCEDURE — 97166 OT EVAL MOD COMPLEX 45 MIN: CPT

## 2024-03-04 PROCEDURE — 85027 COMPLETE CBC AUTOMATED: CPT

## 2024-03-04 RX ORDER — SODIUM CHLORIDE 9 MG/ML
INJECTION, SOLUTION INTRAVENOUS CONTINUOUS
Status: DISCONTINUED | OUTPATIENT
Start: 2024-03-04 | End: 2024-03-05

## 2024-03-04 RX ADMIN — ATORVASTATIN CALCIUM 40 MG: 40 TABLET, FILM COATED ORAL at 20:07

## 2024-03-04 RX ADMIN — ASPIRIN 81 MG: 81 TABLET, CHEWABLE ORAL at 09:48

## 2024-03-04 RX ADMIN — ENOXAPARIN SODIUM 40 MG: 100 INJECTION SUBCUTANEOUS at 09:49

## 2024-03-04 RX ADMIN — MEMANTINE 10 MG: 10 TABLET ORAL at 09:48

## 2024-03-04 RX ADMIN — SODIUM CHLORIDE: 9 INJECTION, SOLUTION INTRAVENOUS at 14:38

## 2024-03-04 RX ADMIN — SODIUM CHLORIDE: 9 INJECTION, SOLUTION INTRAVENOUS at 07:18

## 2024-03-04 RX ADMIN — MEMANTINE 10 MG: 10 TABLET ORAL at 20:07

## 2024-03-04 ASSESSMENT — PAIN SCALES - GENERAL: PAINLEVEL_OUTOF10: 0

## 2024-03-04 NOTE — CARE COORDINATION
03/04/24 1416   Service Assessment   Patient Orientation Alert and Oriented   Cognition Short Term Memory Deficit   History Provided By Patient;Spouse   Primary Caregiver Self   Support Systems Spouse/Significant Other   Patient's Healthcare Decision Maker is: Legal Next of Kin   PCP Verified by CM Yes   Last Visit to PCP Within last year   Prior Functional Level Assistance with the following:;Cooking;Housework;Shopping   Current Functional Level Assistance with the following:;Bathing;Toileting;Mobility   Can patient return to prior living arrangement No   Ability to make needs known: Fair   Family able to assist with home care needs: Yes   Would you like for me to discuss the discharge plan with any other family members/significant others, and if so, who? Yes   Financial Resources Medicare   Community Resources None     This RN case manager to the bedside to initiate DC planning. Patient is from home with wife. PCP and insurance. I did discuss therapy recommendations with patient and wife. They are both agreeable to SNF placement. They would like referral made to [facility]. Referral made. I did leave Medicare SNF list with patient and wife for back up options.     1552 - Second and third choices are [facility]  and [facility]. Awaiting determination from [facility].

## 2024-03-04 NOTE — PROGRESS NOTES
dressing: SBA (seated EOB, mod verbal cues for initiation and continuation of task)  LB dressing: Max A (total assist to don BL socks, able to don pants mod A with assist for threading BL legs and able to manage to hips CGA)  Toileting: CGA (able to complete seated shadi care SBA, CGA for clothing management to/from hips)    Cognitive and Psychosocial Functioning:  Overall cognitive status: Impaired (oriented to self and place only, perseverating thoughts/speech, confused, increased time for thought processing and response, decreased insight into safety awareness and deficits, short term and working memory deficits, easily distractible but able to be redirected)  Affect: Normal     Balance:   Sitting: SBA in unsupported sitting EOB   Standing: CGA in static standing with RW    Functional Mobility:  Bed Mobility: SBA for all aspects sup-sit (HOB elevated to 30', increased time and effort to complete)  Transfers: CGA sit-stand to/from bed/chair (min verbal cues for safe hand placement both directions)    Ambulation: CGA with intermittent min A to correct lateral LOB during functional mobility ~50 ft in hallway with RW (see PT evaluation for full gait assessment)    AM-PAC 6 click short form for inpatient daily activity:   How much help from another person does the patient currently need... Unable  Dep A Lot  Max A A Lot   Mod A A Little  Min A A Little   CGA  SBA None   Mod I  Indep  Sup   1.  Putting on and taking off regular lower body clothing? [] 1    [x] 2   [] 2   [] 3   [] 3   [] 4      2. Bathing (including washing, rinsing, drying)? [] 1   [] 2   [] 2 [x] 3 [] 3 [] 4   3. Toileting, which includes using toilet, bedpan, or urinal? [] 1    [] 2   [] 2   [] 3   [x] 3   [] 4     4. Putting on and taking off regular upper body clothing? [] 1   [] 2   [] 2   [] 3   [x] 3    [] 4      5. Taking care of personal grooming such as brushing teeth? [] 1   [] 2    [] 2 [] 3    [x] 3   [] 4      6. Eating meals?   [] 1   []

## 2024-03-04 NOTE — PROGRESS NOTES
performed. Up-to-date CT equipment and radiation dose reduction techniques are utilized. COMPARISON: None. FINDINGS: There is optimal enhancement of pulmonary and mediastinal vasculature. There is no CT evidence of pulmonary embolism. Thoracic aorta is normal in caliber. The tracheobronchial tree is patent. Linear bands of increased density are seen in the posterior lower lobes bilaterally, and in the lingular segment of the left upper lobe, most consistent with subsegmental atelectasis. No significant areas of airspace consolidation are seen. There is relative elevation of the left diaphragm. The heart is not enlarged. No pericardial effusion is seen. No significant pleural thickening or pleural effusions. No hilar, mediastinal or axillary adenopathy is seen. Lower neck unremarkable. Images of the upper abdomen demonstrate a couple of small low-attenuation lesions in the liver, nonspecific, possible cysts. There is contrast in the kidneys from prior contrast administration today. No other acute findings in the upper abdomen.     No CT evidence of pulmonary embolism. Relative elevation of the left diaphragm. Mild bibasilar atelectasis. No other acute findings in the chest. Electronically signed by Saniya Melissa MD    CTA HEAD NECK W CONTRAST    Result Date: 3/3/2024  PROCEDURE: CT ANGIOGRAPHY NECK WITH/WITHOUT CONTRAST INDICATION: Fall, altered mental status, bilateral lower extremity weakness. COMPARISON: none TECHNIQUE: Limited noncontrast CT imaging performed for the purposes of IV contrast bolus tracking. Axial CT imaging obtained from skull base through the lung apices following administration of IV contrast. Axial images, multiplanar reformatted images, and maximum intensity projection images were reviewed for CT angiographic technique.   Individualized dose optimization technique was used in order to meet ALARA standards for radiation dose reduction.  In addition to vendor specific dose reduction algorithms,  TRICHOMONAS NONE SEEN 03/03/2024 03:49 PM    YEAST CANCELED 07/10/2023 09:53 AM    BACTERIA NEGATIVE 03/03/2024 03:49 PM    CLARITYU CLEAR 03/03/2024 03:49 PM    SPECGRAV 1.020 03/03/2024 03:49 PM    LEUKOCYTESUR NEGATIVE 03/03/2024 03:49 PM    UROBILINOGEN 0.2 03/03/2024 03:49 PM    BILIRUBINUR NEGATIVE 03/03/2024 03:49 PM    BLOODU TRACE 03/03/2024 03:49 PM    GLUCOSEU Negative 07/10/2023 09:53 AM    KETUA 40 03/03/2024 03:49 PM     Urine Cultures: No results found for: \"LABURIN\"  Blood Cultures: No results found for: \"BC\"  No results found for: \"BLOODCULT2\"  Organism: No results found for: \"ORG\"      Electronically signed by Alan Andrade MD on 3/4/2024 at 12:26 PM

## 2024-03-04 NOTE — CONSULTS
[facility] ACUTE CARE PHYSICAL THERAPY EVALUATION  Steve Humphries, 1945, 1105/1105-A, 3/4/2024    History  [place]:  The primary encounter diagnosis was Weakness of both lower extremities. Diagnoses of Pneumonia due to infectious organism, unspecified laterality, unspecified part of lung, Septicemia (HCC), and Acute respiratory failure with hypoxia (HCC) were also pertinent to this visit.  Patient  has a past medical history of Acute CVA (cerebrovascular accident) (HCC), Dementia (HCC), and UTI (urinary tract infection).  Patient  has a past surgical history that includes Appendectomy; hernia repair; and TURP (N/A, 2/21/2022).    Discharge Recommendation: SNF    Subjective:    Patient states:  \"I need to call the police!\"      Pain:  none stated.      Communication with other providers:  Handoff to RN, OT    Restrictions: Fall precautions    Home Setup/Prior level of function  Social/Functional History  Lives With: Spouse  Type of Home: House  Home Layout: Two level, Bed/Bath upstairs, Able to Live on Main level with bedroom/bathroom  Home Access: Level entry  Bathroom Shower/Tub: Walk-in shower  Bathroom Toilet: Standard  Bathroom Equipment: Shower chair  Home Equipment: Walker, rolling  Receives Help From: Family  ADL Assistance: Independent  Homemaking Assistance: Independent  Ambulation Assistance: Independent  Transfer Assistance: Independent  Additional Comments: Pt not a reliable historian this date, would recommend verifiying with family member    Examination of body systems (includes body structures/functions, activity/participation limitations):  Observation:  Pt in bed upon arrival and agreeable to therapy, perseverating on calling police to find his wife, constant redirection, pt continued to perseverate. Called wife at end of session, spoke to pt, stating she would arrive soon.  Vision:  WFL  Hearing:  WFL  Cardiopulmonary:  on room air, telemetry vitals stable throughout  Cognition:  tolerance, decreased strength, impaired gait, impaired transfers, and impaired bed mobility. Pt would benefit from continued PT until d/c, following d/c pt would recommend d/c to SNF.    Complexity: moderate    Prognosis: Good, no significant barriers to participation at this time.   General Plan: 3-5 times per week       Equipment: TBD at next level of care    Goals:  Short Term Goals  Time Frame for Short Term Goals: 1 week  Short Term Goal 1: Pt will complete bed mobility w mod I  Short Term Goal 2: Pt will complete STS transfer w LRAD SBA  Short Term Goal 3: Pt will ambualete 75' w LRAD SBA       Treatment plan:  Bed mobility, transfers, balance, gait, TA, TX, stairs     Recommendations for NURSING mobility: Amb w RW Min A    Time:   Time in: 0907  Time out: 0926  Timed treatment minutes: 9  Total time: 19    Electronically signed by:    VONDA Duarte  3/4/2024, 11:50 AM

## 2024-03-05 PROBLEM — R29.898 WEAKNESS OF BOTH LOWER EXTREMITIES: Status: ACTIVE | Noted: 2024-03-05

## 2024-03-05 LAB
ANION GAP SERPL CALCULATED.3IONS-SCNC: 10 MMOL/L (ref 7–16)
BASOPHILS ABSOLUTE: 0 K/CU MM
BASOPHILS RELATIVE PERCENT: 0.3 % (ref 0–1)
BUN SERPL-MCNC: 11 MG/DL (ref 6–23)
CALCIUM SERPL-MCNC: 8.3 MG/DL (ref 8.3–10.6)
CHLORIDE BLD-SCNC: 107 MMOL/L (ref 99–110)
CHOLEST SERPL-MCNC: 154 MG/DL
CO2: 23 MMOL/L (ref 21–32)
CREAT SERPL-MCNC: 1.2 MG/DL (ref 0.9–1.3)
DIFFERENTIAL TYPE: ABNORMAL
EOSINOPHILS ABSOLUTE: 0.4 K/CU MM
EOSINOPHILS RELATIVE PERCENT: 4.5 % (ref 0–3)
ESTIMATED AVERAGE GLUCOSE: 120 MG/DL
GFR SERPL CREATININE-BSD FRML MDRD: >60 ML/MIN/1.73M2
GLUCOSE BLD-MCNC: 100 MG/DL (ref 70–99)
GLUCOSE BLD-MCNC: 109 MG/DL (ref 70–99)
GLUCOSE BLD-MCNC: 111 MG/DL (ref 70–99)
GLUCOSE BLD-MCNC: 114 MG/DL (ref 70–99)
GLUCOSE SERPL-MCNC: 103 MG/DL (ref 70–99)
HBA1C MFR BLD: 5.8 % (ref 4.2–6.3)
HCT VFR BLD CALC: 37.3 % (ref 42–52)
HDLC SERPL-MCNC: 29 MG/DL
HEMOGLOBIN: 12.2 GM/DL (ref 13.5–18)
IMMATURE NEUTROPHIL %: 0.2 % (ref 0–0.43)
LDLC SERPL CALC-MCNC: 105 MG/DL
LYMPHOCYTES ABSOLUTE: 3.6 K/CU MM
LYMPHOCYTES RELATIVE PERCENT: 38.7 % (ref 24–44)
MAGNESIUM: 1.8 MG/DL (ref 1.8–2.4)
MCH RBC QN AUTO: 30.3 PG (ref 27–31)
MCHC RBC AUTO-ENTMCNC: 32.7 % (ref 32–36)
MCV RBC AUTO: 92.8 FL (ref 78–100)
MONOCYTES ABSOLUTE: 0.9 K/CU MM
MONOCYTES RELATIVE PERCENT: 9.5 % (ref 0–4)
NUCLEATED RBC %: 0 %
PDW BLD-RTO: 12.7 % (ref 11.7–14.9)
PHOSPHORUS: 2.4 MG/DL (ref 2.5–4.9)
PLATELET # BLD: 311 K/CU MM (ref 140–440)
PMV BLD AUTO: 9.7 FL (ref 7.5–11.1)
POTASSIUM SERPL-SCNC: 4 MMOL/L (ref 3.5–5.1)
RBC # BLD: 4.02 M/CU MM (ref 4.6–6.2)
SEGMENTED NEUTROPHILS ABSOLUTE COUNT: 4.3 K/CU MM
SEGMENTED NEUTROPHILS RELATIVE PERCENT: 46.8 % (ref 36–66)
SODIUM BLD-SCNC: 140 MMOL/L (ref 135–145)
TOTAL CK: 201 IU/L (ref 38–174)
TOTAL IMMATURE NEUTOROPHIL: 0.02 K/CU MM
TOTAL NUCLEATED RBC: 0 K/CU MM
TRIGL SERPL-MCNC: 98 MG/DL
WBC # BLD: 9.2 K/CU MM (ref 4–10.5)

## 2024-03-05 PROCEDURE — 2580000003 HC RX 258: Performed by: STUDENT IN AN ORGANIZED HEALTH CARE EDUCATION/TRAINING PROGRAM

## 2024-03-05 PROCEDURE — 36415 COLL VENOUS BLD VENIPUNCTURE: CPT

## 2024-03-05 PROCEDURE — 99223 1ST HOSP IP/OBS HIGH 75: CPT | Performed by: NURSE PRACTITIONER

## 2024-03-05 PROCEDURE — 97530 THERAPEUTIC ACTIVITIES: CPT

## 2024-03-05 PROCEDURE — 94761 N-INVAS EAR/PLS OXIMETRY MLT: CPT

## 2024-03-05 PROCEDURE — 82962 GLUCOSE BLOOD TEST: CPT

## 2024-03-05 PROCEDURE — 6360000002 HC RX W HCPCS: Performed by: STUDENT IN AN ORGANIZED HEALTH CARE EDUCATION/TRAINING PROGRAM

## 2024-03-05 PROCEDURE — 80061 LIPID PANEL: CPT

## 2024-03-05 PROCEDURE — 87040 BLOOD CULTURE FOR BACTERIA: CPT

## 2024-03-05 PROCEDURE — 1200000000 HC SEMI PRIVATE

## 2024-03-05 PROCEDURE — 85025 COMPLETE CBC W/AUTO DIFF WBC: CPT

## 2024-03-05 PROCEDURE — 97116 GAIT TRAINING THERAPY: CPT

## 2024-03-05 PROCEDURE — 83735 ASSAY OF MAGNESIUM: CPT

## 2024-03-05 PROCEDURE — 80048 BASIC METABOLIC PNL TOTAL CA: CPT

## 2024-03-05 PROCEDURE — 6370000000 HC RX 637 (ALT 250 FOR IP): Performed by: STUDENT IN AN ORGANIZED HEALTH CARE EDUCATION/TRAINING PROGRAM

## 2024-03-05 PROCEDURE — 82550 ASSAY OF CK (CPK): CPT

## 2024-03-05 PROCEDURE — 84100 ASSAY OF PHOSPHORUS: CPT

## 2024-03-05 PROCEDURE — 83036 HEMOGLOBIN GLYCOSYLATED A1C: CPT

## 2024-03-05 RX ADMIN — MEMANTINE 10 MG: 10 TABLET ORAL at 21:47

## 2024-03-05 RX ADMIN — MEMANTINE 10 MG: 10 TABLET ORAL at 09:35

## 2024-03-05 RX ADMIN — ENOXAPARIN SODIUM 40 MG: 100 INJECTION SUBCUTANEOUS at 09:35

## 2024-03-05 RX ADMIN — ATORVASTATIN CALCIUM 40 MG: 40 TABLET, FILM COATED ORAL at 21:47

## 2024-03-05 RX ADMIN — SODIUM CHLORIDE: 9 INJECTION, SOLUTION INTRAVENOUS at 01:27

## 2024-03-05 RX ADMIN — ASPIRIN 81 MG: 81 TABLET, CHEWABLE ORAL at 09:35

## 2024-03-05 ASSESSMENT — PAIN SCALES - GENERAL
PAINLEVEL_OUTOF10: 0
PAINLEVEL_OUTOF10: 0

## 2024-03-05 NOTE — CONSULTS
Vascular/Interventional Neurology Service Consult Note  [facility]   Patient Name: Steve Humphries  : 1945        Subjective:   Reason for consult:   Steve Humphries 78 -year-old male with a past medical history HTN, BPH s/p TURP, CKD, Alzheimer dementia, stroke presenting to [facility] with bilateral lower extremity weakness.  The patient's wife states that he has been having progress generalized weakness.  States to get off the couch he slides off thwe couch andputs his hands on the floor.  He was unable to get up off the floor yesterday.  Reports he was down for approximately 12 hours.  Upon arrival EMS found his O2 sat was 83% .  CT PE with no acute infection or concerning findings.  CT head demonstrated remote right parietal lobe cortical infarct, right basal ganglia remote lacunar infarct.  No acute process. CTA head and neck showed chronic occlusion of the right vertebral artery origin with retrograde flow within the distal right vertebral artery.  Severe stenosis of the left vertebral artery origin.  Bilateral proximal ICA stenosis measuring 70% on the right and 50% on the left.  MRI of the brain shows subacute infarct in the right basal ganglia/right frontal lobe with persistent but overall decreased diffusion restriction compared to 2024.  Severe chronic small vessel ischemic disease with multiple areas of chronic infarct similar to 2024.    The patient has a history of left occipital lobe hemorrhage stroke in 2022 at [facility].  He followed up with neurology at [facility] who suspected amyloid angiopathy etiology.  Then a right temporal lobe ischemic in 2023.   He follows with neurology, Dr. Talbot as an outpatient for history of stroke and memory changes. Neurology has ordered MRI, CTA and echocardiogram as part of stroke workup.   The patient was referred to Dr. Wiggins's office for carotid stenosis.  He had an appointment scheduled with Dr. Wiggins's  vertebral artery stenosis/occlusion.     Neuroimaging  CTA: Chronic occlusion right vertebral artery origin.  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  Multiple areas of chronic infarcts.     -Pertinent images discussed/reviewed with Dr. Wiggins who has fully participated in the care of this patient and agrees with plan.  -Patient not a candidate for intervention at this time.  Continue medical management.  -Continue aspirin and atorvastatin for secondary prevention of stroke.  -Pneumonia management per primary team  -PT/OT/ST, discharge planning, approved for [facility].       Thank you for allowing us to participate in the care of your patient.  If there are any questions regarding evaluation please feel free to contact us.     PAOLA Yusuf - CNP, 3/5/2024

## 2024-03-05 NOTE — PROGRESS NOTES
03/05/24 1419   Encounter Summary   Encounter Overview/Reason  Initial Encounter   Service Provided For: Patient   Referral/Consult From: [facility]   Support System Family members   Last Encounter  03/05/24  (PT coping well today, has a sitter in the room, has support with family, no concerns at the moment, continue to provide support.)   Complexity of Encounter Low   Begin Time 1412   End Time  1421   Total Time Calculated 9 min   Spiritual/Emotional needs   Type Spiritual Support   Assessment/Intervention/Outcome   Assessment Calm   Intervention Sustaining Presence/Ministry of presence   Outcome Coping   Plan and Referrals   Plan/Referrals Continue Support (comment)

## 2024-03-05 NOTE — CARE COORDINATION
**Upon discharge patient will be going to   [facility]    * Call facility to advise patient is ready to be discharged.     *Please notify family.    * Please fax  H/P,Scripts, AVS and  Completed DEBORA to [phone]  *Please call report to [phone]    Set up [facility] - [phone]    **Needs Completed DEBORA

## 2024-03-05 NOTE — PROGRESS NOTES
Physical Therapy    Name: Steve Humphries MRN: [id number] :   1945   Date:  3/5/2024   Ad[facility] Date: 3/3/2024 [facility]   Restrictions/Precautions:        Fall precautions     Communication with other providers:  Suzie MARLOW states pt is ok to see for therapy  Subjective:  Patient states:  he will walk, during walking he stated he had to use the rest room  Pain:   Location, Type, Intensity (0/10 to 10/10):  0/10  Objective:    Observation:  pt was in the bed  Treatment, including education/measures:  Transfers with line management of tele and IV  Rolling: SBA  Supine to sit :SBA  Scooting :SBA  Sit to stand :CGA with VC's for motor planning from toilet and bed  Stand to sit :CGA  Gait: with VC's for motor planning to chair and toilet  Pt amb with RW for 60 ft x 2 and 10 ft with CGA to m in A of 1 for unsteady gt   Gait Comments: with VC's' and TC's for B LE placement, walker placement and sequence throughout ambulation; with VC's and TC's to maintain upright posture in order to avoid COM shifting outside of EKATERINA; with VC's for PLB throughout ambulation  Attempted to have pt do exercises but pt was not agreeable  Safety  Patient left safely in the chair, with call light/phone in reach with alarm applied and nursing in the room. Gait belt was used for transfers and gait.  Assessment / Impression:     Patient's tolerance of treatment:  good, pt was having difficulty following directions and needed VC's for motor planning   Adverse Reaction: none  Significant change in status and impact:  increased distance  Barriers to improvement:  endurance, fatigue  Plan for Next Session:    Will cont to work towards pt's goals per his tolerance  Time in:  1000  Time out:  1023  Timed treatment minutes:  23  Total treatment time:  23  Previously filed items:  Social/Functional History  Lives With: Spouse  Type of Home: House  Home Layout: Two level, Bed/Bath upstairs, Able to Live on Main level with  bedroom/bathroom  Home Access: Level entry  Bathroom Shower/Tub: Walk-in shower  Bathroom Toilet: Standard  Bathroom Equipment: Shower chair  Home Equipment: Walker, rolling  Receives Help From: Family  ADL Assistance: Independent  Homemaking Assistance: Independent  Ambulation Assistance: Independent  Transfer Assistance: Independent  Additional Comments: Pt not a reliable historian this date, would recommend verifiying with family member  Short Term Goals  Time Frame for Short Term Goals: 1 week  Short Term Goal 1: Pt will complete bed mobility w mod I  Short Term Goal 2: Pt will complete STS transfer w LRAD SBA  Short Term Goal 3: Pt will ambualete 75' w LRAD SBA     Electronically signed by:    Tiffani Faith PTA  3/5/2024, 10:24 AM

## 2024-03-05 NOTE — CARE COORDINATION
Chart reviewed and patient discussed in IDR. Approved for [facility]. Possible DC tomorrow. Monse with [facility] updated on possible DC date.

## 2024-03-05 NOTE — PROGRESS NOTES
Pt being sexually inappropriate with the sitter; multiple attempts to redirect;  will switch to a male sitter at this time

## 2024-03-05 NOTE — CONSULTS
Neurology Service Consult Note  [facility]   Patient Name: Steve Humphries  : 1945        Subjective:   Reason for consult: worsening confusion, balance issues, concern for recurrent stroke  78 y.o.  male with history, BPH s/p TURP, CKD, dementia, HTN, stroke, presenting to [facility] with fall and bilateral lower extremity weakness. Patient was found on the floor of his home by EMS with oxygen saturations of 83%. He was unable to give any significant description of the events that led to his fall. CT head and CTA head and neck were non acute.   Chart was reviewed in detail, patient was seen and assessed. Wife is at bedside who helps to provide history. She tells me the patient did not fall but rather went to stand up from the couch and then slid down to the ground. Once on the ground he was not able to remember how to stand up despite  her giving direction. 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 He does not wander, has not been aggressive but inappropriate comments have become more prevalent. Patient is able to follow commands. No focal or lateralizing weakness noted in the upper or lower extremities. Wife shares with me the patient has been less engaged, he is starting to refuse things. Last week had appointment with Dr. Wiggins (referred by Dr. Talbot) but on the day of the appointment just refused to go.     Patient has history of left posterior lobe hemorrhage secondary to suspected amyloid angiopathy. He underwent neuropsych assessment 3/9/2023 at [facility] with Dr. Burns. 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 Patient is noted to have had admission to [facility] 10/2022 with complaints of headache, vision changes, confusion and aphasia. CT head noted left temporal lobe hemorrhage without shift.   Patient established care with  evidence of stroke on MRI. Do feel balance is in part impaired due to lower extremity neuropathy.   Neuro imaging as above  CT head notes remote stroke  CTA head and neck notes chronic right vert occlusion, severe stenosis of the left vertebral artery, bilateral proximal ICA stenosis 70% right, 50% left  MRI brain notes known subacute infarct in the right basal ganglia/right frontal lobe  Continue aspirin and atorvastatin 40 mg for secondary stroke prevention given  history of stroke  , A1C 5.8  Continue Namenda 10 mg BID   Blood cultures pending  Leukocytosis improving, management of pneumonia by IM  Dr. Wiggins, neuro intervention consulted.   Patient likely not a candidate for intervention however was referred outpatient by Dr. Talbot and missed recent appointment. CTA with progression of arteriosclerotic changes since last exam.   PT/OT/ST as recommended  Follow up with our office at discharge.   No further recommendations, we will sign off, please contact us with any new concerns.         Thank you for allowing us to participate in the care of your patient.  If there are any questions regarding evaluation please feel free to contact us.     PAOLA Bhatti - CNP, 3/5/2024     Patient with concern for possible stroke and worsening dementia. Nonfocal exam. Appreciate Dr. Wiggins input. MRI reviewed. Will fu with Dr. Talbot outpatient.     Attending Note:  I have rounded on this patient with Leticia SANZ. I have reviewed the chart and we have discussed this case in detail. The patient was seen and examined by myself. Pertinent labs and imaging have been personally reviewed.   Our findings and impressions were discussed with the patient. I concur with the Nurse Practioner's assessment and plan.    Ashley Lopez, DO

## 2024-03-05 NOTE — PROGRESS NOTES
V2.0    [facility] Progress Note      Name:  Steve Humphries /Age/Sex: 1945  (78 y.o. male)   MRN & CSN:  [id number] & [id number] Encounter Date/Time: 3/5/2024 12:26 PM EST   Location:  [facility]/1105-A PCP: Janes Rai MD     Attending:Alan Andrade MD       Hospital Day: 3    Assessment and Recommendations   Steve Humphries is a 78 y.o. male with pmh of Alzheimer's dementia, CVA who presents with CAP (community acquired pneumonia) due to Chlamydia species    Past Medical History:   Diagnosis Date    Acute CVA (cerebrovascular accident) (HCC) 10/07/2022    Dementia (HCC)     UTI (urinary tract infection)          Plan:   Bilateral lower extremity weakness-no suspicion of CVA, CT head and CTA negative for acute changes, recent MRI 2024 with subacute infarct in the right basal ganglia and the right occipital lobe, will repeat MRI brain without contrast-1.  Subacute infarct in the right basal ganglia/right frontal lobe with  persistent but overall decreased diffusion restriction compared to 2024. 2.  Severe chronic small vessel ischemic disease with multiple areas of chronic  infarct similar to 2024.3.  New acute paranasal sinus disease start with statin and aspirin, TSH 3.16, B12 within normal limits  *Dementia-likely Alzheimer's, continue memantine  Acute hypoxemic respiratory failure-patient was placed on 4 L nasal cannula as reported to have oxygen dropped to 85% of room air in the emergency department, on admission patient saturating more than 95% of the room air, chest x-ray and CT PE with no acute infection or other concerning findings-will monitor off antibiotics and titrate oxygen as able  Mild rhabdomyolysis-likely in the setting of being on floor for more than 12 hours, will continue IV fluids and recheck tomorrow  Bcx Staph epidermidis +ve : 1/ bottles , likely contaminant, follow repeat Bcx      Diet ADULT DIET; Regular; No Added Salt (3-4 gm)   DVT Prophylaxis [x] Lovenox, []  Heparin, []  TECHNIQUE: Limited noncontrast CT imaging performed for the purposes of IV contrast bolus tracking. Axial CT imaging obtained from skull base through the lung apices following administration of IV contrast. Axial images, multiplanar reformatted images, and maximum intensity projection images were reviewed for CT angiographic technique.   Individualized dose optimization technique was used in order to meet ALARA standards for radiation dose reduction.  In addition to vendor specific dose reduction algorithms, the dose reduction techniques vary based on the specific scanner utilized but frequently include automated exposure control, adjustment of the mA and/or kV according to patient size, and use of iterative reconstruction technique.  NASCET criteria used to determine percent stenosis measurements (% stenosis = (1-narrowest diameter/diameter of normal distal segment)x100). IV contrast: 75 mL  Isovue 370. FINDINGS: The aortic arch is without aneurysm or dissection. The common carotid arteries are normal in caliber. Atherosclerotic plaque at the carotid bifurcation and bulbs causes 70% stenosis of the right and 50% stenosis of the left internal carotid artery bulbs. External carotid arteries are patent. No dissection is identified. There is chronic occlusion of the right vertebral artery and severe stenosis of left vertebral artery. No acute dissection is identified. Lung apices are clear. No neck lymphadenopathy is identified. There are changes of cervical spondylosis.     1. 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 2. Severe stenosis of the left vertebral artery origin. 3. Bilateral proximal internal carotid artery stenoses measuring 70% on the right and 50% on the left.  PROCEDURE: CT ANGIOGRAPHY HEAD WITH/WITHOUT CONTRAST INDICATION: Fall, altered mental status, bilateral lower extremity weakness. COMPARISON: none TECHNIQUE: Axial CT

## 2024-03-06 LAB
ANION GAP SERPL CALCULATED.3IONS-SCNC: 9 MMOL/L (ref 7–16)
BASOPHILS ABSOLUTE: 0.1 K/CU MM
BASOPHILS RELATIVE PERCENT: 0.5 % (ref 0–1)
BUN SERPL-MCNC: 9 MG/DL (ref 6–23)
CALCIUM SERPL-MCNC: 8.8 MG/DL (ref 8.3–10.6)
CHLORIDE BLD-SCNC: 107 MMOL/L (ref 99–110)
CO2: 25 MMOL/L (ref 21–32)
CREAT SERPL-MCNC: 1.1 MG/DL (ref 0.9–1.3)
CULTURE: ABNORMAL
CULTURE: ABNORMAL
DIFFERENTIAL TYPE: ABNORMAL
EOSINOPHILS ABSOLUTE: 0.4 K/CU MM
EOSINOPHILS RELATIVE PERCENT: 3.8 % (ref 0–3)
GFR SERPL CREATININE-BSD FRML MDRD: >60 ML/MIN/1.73M2
GLUCOSE BLD-MCNC: 110 MG/DL (ref 70–99)
GLUCOSE BLD-MCNC: 119 MG/DL (ref 70–99)
GLUCOSE SERPL-MCNC: 101 MG/DL (ref 70–99)
HCT VFR BLD CALC: 40 % (ref 42–52)
HEMOGLOBIN: 13 GM/DL (ref 13.5–18)
IMMATURE NEUTROPHIL %: 0.3 % (ref 0–0.43)
LYMPHOCYTES ABSOLUTE: 4.5 K/CU MM
LYMPHOCYTES RELATIVE PERCENT: 46.5 % (ref 24–44)
Lab: ABNORMAL
MAGNESIUM: 1.9 MG/DL (ref 1.8–2.4)
MCH RBC QN AUTO: 30.4 PG (ref 27–31)
MCHC RBC AUTO-ENTMCNC: 32.5 % (ref 32–36)
MCV RBC AUTO: 93.5 FL (ref 78–100)
MONOCYTES ABSOLUTE: 0.9 K/CU MM
MONOCYTES RELATIVE PERCENT: 9.2 % (ref 0–4)
NUCLEATED RBC %: 0 %
PDW BLD-RTO: 12.7 % (ref 11.7–14.9)
PHOSPHORUS: 3 MG/DL (ref 2.5–4.9)
PLATELET # BLD: 324 K/CU MM (ref 140–440)
PMV BLD AUTO: 9.8 FL (ref 7.5–11.1)
POTASSIUM SERPL-SCNC: 4.5 MMOL/L (ref 3.5–5.1)
RBC # BLD: 4.28 M/CU MM (ref 4.6–6.2)
SEGMENTED NEUTROPHILS ABSOLUTE COUNT: 3.8 K/CU MM
SEGMENTED NEUTROPHILS RELATIVE PERCENT: 39.7 % (ref 36–66)
SODIUM BLD-SCNC: 141 MMOL/L (ref 135–145)
SPECIMEN: ABNORMAL
TOTAL IMMATURE NEUTOROPHIL: 0.03 K/CU MM
TOTAL NUCLEATED RBC: 0 K/CU MM
WBC # BLD: 9.6 K/CU MM (ref 4–10.5)

## 2024-03-06 PROCEDURE — 99231 SBSQ HOSP IP/OBS SF/LOW 25: CPT | Performed by: NURSE PRACTITIONER

## 2024-03-06 PROCEDURE — 6370000000 HC RX 637 (ALT 250 FOR IP): Performed by: STUDENT IN AN ORGANIZED HEALTH CARE EDUCATION/TRAINING PROGRAM

## 2024-03-06 PROCEDURE — 6360000002 HC RX W HCPCS: Performed by: STUDENT IN AN ORGANIZED HEALTH CARE EDUCATION/TRAINING PROGRAM

## 2024-03-06 PROCEDURE — 82962 GLUCOSE BLOOD TEST: CPT

## 2024-03-06 PROCEDURE — 94761 N-INVAS EAR/PLS OXIMETRY MLT: CPT

## 2024-03-06 PROCEDURE — 2580000003 HC RX 258: Performed by: STUDENT IN AN ORGANIZED HEALTH CARE EDUCATION/TRAINING PROGRAM

## 2024-03-06 PROCEDURE — 1200000000 HC SEMI PRIVATE

## 2024-03-06 RX ADMIN — ATORVASTATIN CALCIUM 40 MG: 40 TABLET, FILM COATED ORAL at 22:29

## 2024-03-06 RX ADMIN — MEMANTINE 10 MG: 10 TABLET ORAL at 08:38

## 2024-03-06 RX ADMIN — SODIUM CHLORIDE, PRESERVATIVE FREE 10 ML: 5 INJECTION INTRAVENOUS at 22:37

## 2024-03-06 RX ADMIN — MEMANTINE 10 MG: 10 TABLET ORAL at 22:29

## 2024-03-06 RX ADMIN — ENOXAPARIN SODIUM 40 MG: 100 INJECTION SUBCUTANEOUS at 08:38

## 2024-03-06 RX ADMIN — ASPIRIN 81 MG: 81 TABLET, CHEWABLE ORAL at 08:38

## 2024-03-06 ASSESSMENT — PAIN SCALES - GENERAL
PAINLEVEL_OUTOF10: 0

## 2024-03-06 NOTE — PROGRESS NOTES
results for input(s): \"LACTA\" in the last 72 hours.  BNP:   No results for input(s): \"PROBNP\" in the last 72 hours.    UA:  Lab Results   Component Value Date/Time    NITRU NEGATIVE 03/03/2024 03:49 PM    NITRU Negative 07/10/2023 09:53 AM    COLORU YELLOW 03/03/2024 03:49 PM    PHUR 6.0 07/10/2023 09:53 AM    LABCAST None seen 07/10/2023 09:53 AM    LABCAST CANCELED 07/10/2023 09:53 AM    WBCUA 10 03/03/2024 03:49 PM    RBCUA 9 03/03/2024 03:49 PM    MUCUS MODERATE 03/03/2024 03:49 PM    TRICHOMONAS NONE SEEN 03/03/2024 03:49 PM    YEAST CANCELED 07/10/2023 09:53 AM    BACTERIA NEGATIVE 03/03/2024 03:49 PM    CLARITYU CLEAR 03/03/2024 03:49 PM    SPECGRAV 1.020 03/03/2024 03:49 PM    LEUKOCYTESUR NEGATIVE 03/03/2024 03:49 PM    UROBILINOGEN 0.2 03/03/2024 03:49 PM    BILIRUBINUR NEGATIVE 03/03/2024 03:49 PM    BLOODU TRACE 03/03/2024 03:49 PM    GLUCOSEU Negative 07/10/2023 09:53 AM    KETUA 40 03/03/2024 03:49 PM     Urine Cultures: No results found for: \"LABURIN\"  Blood Cultures: No results found for: \"BC\"  No results found for: \"BLOODCULT2\"  Organism: No results found for: \"ORG\"      Electronically signed by Yulia Mireles MD on 3/6/2024 at 12:39 PM

## 2024-03-06 NOTE — PROGRESS NOTES
Physical Therapy  Attempted to see pt for gt and ex. Pt is very upset that his wife is not here with him today and is not interested in walking or ex. Will cont. Tiffani Faith PTA

## 2024-03-06 NOTE — PROGRESS NOTES
Vascular/Interventional Neurology Progress Note  [facility]  Patient Name: Steve Humphries     : 1945      Subjective:     The patient was seen and examined.  Chart reviewed.  No significant events documented overnight.  The patient is upset that his wife is not here at this time.  He is able to tell me his name and that he is in the hospital today.  He follows simple commands.      Objective:   Scheduled Meds:   memantine  10 mg Oral BID    sodium chloride flush  5-40 mL IntraVENous 2 times per day    enoxaparin  40 mg SubCUTAneous Daily    aspirin  81 mg Oral Daily    atorvastatin  40 mg Oral Nightly     Continuous Infusions:   sodium chloride       PRN Meds:.sodium chloride flush, sodium chloride, potassium chloride **OR** potassium alternative oral replacement **OR** potassium chloride, magnesium sulfate, ondansetron **OR** ondansetron, polyethylene glycol, acetaminophen **OR** acetaminophen    Vital Signs:  [unfilled]     General: Alert to name and place, disoriented to situation, follows simple command  HEENT: NC/AT, EOMI, PERRL, mmm, neck supple  Extremities: no edema, no calf tenderness b/l    Neurological Exam:         Mental Status:  A&O to self, location, and year. NAD, speech clear, language fluent, repetition and naming intact, follows commands appropriately     Cranial Nerves: Right homonymous hemianopsia otherwise CN II-XII intact     Sensation: intact LT/temp UE/LE's b/l     Motor: 5/5 UE/LE's b/l, tone and bulk normal, no pronator drift     Reflexes: 2/4 biceps, triceps, brachioradialis, patellar, and achilles bilaterally; flexor plantar responses bilaterally     Coordination:       Tremors-- None       Rapidly alternating movements (ALBER): No dysdiadonchokinesis b/l     Gait/Station: Deferred    Labs:   Recent Labs     24  1139 24  1953 24  0058 24  0554 24  2347   WBC 13.5*   < > 11.3* 9.2 9.6      < > 138 140 141   K 4.5   < > 4.0 4.0  artery origin.  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  Multiple areas of chronic infarcts.     -Pertinent images discussed/reviewed with Dr. Wiggins who has fully participated in the care of this patient and agrees with plan.  -Patient is not a candidate for neurointervention at this time.  Continue medical management  -Continue aspirin and atorvastatin for secondary prevention of stroke  -Pneumonia management per primary  -PT/OT/ST as able.  [facility]ved for [facility] for discharge      Electronically signed by PAOLA Yusuf - CNP on 3/6/2024 at 9:30 AM   3/6/2024

## 2024-03-06 NOTE — CARE COORDINATION
Chart reviewed and patient discussed in IDR. Respiratory cultures pending. Possible DC tomorrow. Plan remains [facility]. No pre=cert needed.

## 2024-03-07 LAB
ANION GAP SERPL CALCULATED.3IONS-SCNC: 14 MMOL/L (ref 7–16)
BASOPHILS ABSOLUTE: 0 K/CU MM
BASOPHILS RELATIVE PERCENT: 0.5 % (ref 0–1)
BUN SERPL-MCNC: 9 MG/DL (ref 6–23)
CALCIUM SERPL-MCNC: 8.8 MG/DL (ref 8.3–10.6)
CHLORIDE BLD-SCNC: 105 MMOL/L (ref 99–110)
CO2: 21 MMOL/L (ref 21–32)
CREAT SERPL-MCNC: 1.1 MG/DL (ref 0.9–1.3)
DIFFERENTIAL TYPE: ABNORMAL
EOSINOPHILS ABSOLUTE: 0.3 K/CU MM
EOSINOPHILS RELATIVE PERCENT: 3.2 % (ref 0–3)
GFR SERPL CREATININE-BSD FRML MDRD: >60 ML/MIN/1.73M2
GLUCOSE BLD-MCNC: 120 MG/DL (ref 70–99)
GLUCOSE BLD-MCNC: 99 MG/DL (ref 70–99)
GLUCOSE SERPL-MCNC: 96 MG/DL (ref 70–99)
HCT VFR BLD CALC: 44.6 % (ref 42–52)
HEMOGLOBIN: 14.4 GM/DL (ref 13.5–18)
IMMATURE NEUTROPHIL %: 0.3 % (ref 0–0.43)
LYMPHOCYTES ABSOLUTE: 2.8 K/CU MM
LYMPHOCYTES RELATIVE PERCENT: 35.7 % (ref 24–44)
MCH RBC QN AUTO: 30.8 PG (ref 27–31)
MCHC RBC AUTO-ENTMCNC: 32.3 % (ref 32–36)
MCV RBC AUTO: 95.5 FL (ref 78–100)
MONOCYTES ABSOLUTE: 0.7 K/CU MM
MONOCYTES RELATIVE PERCENT: 8.3 % (ref 0–4)
NUCLEATED RBC %: 0 %
PDW BLD-RTO: 13.1 % (ref 11.7–14.9)
PLATELET # BLD: 335 K/CU MM (ref 140–440)
PMV BLD AUTO: 10.3 FL (ref 7.5–11.1)
POTASSIUM SERPL-SCNC: 4 MMOL/L (ref 3.5–5.1)
PROCALCITONIN SERPL-MCNC: 0.04 NG/ML
RBC # BLD: 4.67 M/CU MM (ref 4.6–6.2)
SEGMENTED NEUTROPHILS ABSOLUTE COUNT: 4.1 K/CU MM
SEGMENTED NEUTROPHILS RELATIVE PERCENT: 52 % (ref 36–66)
SODIUM BLD-SCNC: 140 MMOL/L (ref 135–145)
TOTAL IMMATURE NEUTOROPHIL: 0.02 K/CU MM
TOTAL NUCLEATED RBC: 0 K/CU MM
WBC # BLD: 7.9 K/CU MM (ref 4–10.5)

## 2024-03-07 PROCEDURE — 36415 COLL VENOUS BLD VENIPUNCTURE: CPT

## 2024-03-07 PROCEDURE — 80048 BASIC METABOLIC PNL TOTAL CA: CPT

## 2024-03-07 PROCEDURE — 6370000000 HC RX 637 (ALT 250 FOR IP): Performed by: STUDENT IN AN ORGANIZED HEALTH CARE EDUCATION/TRAINING PROGRAM

## 2024-03-07 PROCEDURE — 85025 COMPLETE CBC W/AUTO DIFF WBC: CPT

## 2024-03-07 PROCEDURE — 99231 SBSQ HOSP IP/OBS SF/LOW 25: CPT | Performed by: NURSE PRACTITIONER

## 2024-03-07 PROCEDURE — 2580000003 HC RX 258: Performed by: STUDENT IN AN ORGANIZED HEALTH CARE EDUCATION/TRAINING PROGRAM

## 2024-03-07 PROCEDURE — 97116 GAIT TRAINING THERAPY: CPT

## 2024-03-07 PROCEDURE — 2580000003 HC RX 258: Performed by: FAMILY MEDICINE

## 2024-03-07 PROCEDURE — 84145 PROCALCITONIN (PCT): CPT

## 2024-03-07 PROCEDURE — 1200000000 HC SEMI PRIVATE

## 2024-03-07 PROCEDURE — 6360000002 HC RX W HCPCS: Performed by: STUDENT IN AN ORGANIZED HEALTH CARE EDUCATION/TRAINING PROGRAM

## 2024-03-07 PROCEDURE — 6360000002 HC RX W HCPCS: Performed by: FAMILY MEDICINE

## 2024-03-07 PROCEDURE — 82962 GLUCOSE BLOOD TEST: CPT

## 2024-03-07 RX ORDER — AMLODIPINE BESYLATE 5 MG/1
5 TABLET ORAL DAILY
Status: DISCONTINUED | OUTPATIENT
Start: 2024-03-07 | End: 2024-03-08 | Stop reason: HOSPADM

## 2024-03-07 RX ADMIN — CEFTRIAXONE 1000 MG: 1 INJECTION, POWDER, FOR SOLUTION INTRAMUSCULAR; INTRAVENOUS at 00:08

## 2024-03-07 RX ADMIN — MEMANTINE 10 MG: 10 TABLET ORAL at 21:35

## 2024-03-07 RX ADMIN — AMLODIPINE BESYLATE 5 MG: 5 TABLET ORAL at 18:32

## 2024-03-07 RX ADMIN — SODIUM CHLORIDE, PRESERVATIVE FREE 10 ML: 5 INJECTION INTRAVENOUS at 10:13

## 2024-03-07 RX ADMIN — ASPIRIN 81 MG: 81 TABLET, CHEWABLE ORAL at 10:12

## 2024-03-07 RX ADMIN — SODIUM CHLORIDE, PRESERVATIVE FREE 10 ML: 5 INJECTION INTRAVENOUS at 21:48

## 2024-03-07 RX ADMIN — MEMANTINE 10 MG: 10 TABLET ORAL at 10:12

## 2024-03-07 RX ADMIN — ENOXAPARIN SODIUM 40 MG: 100 INJECTION SUBCUTANEOUS at 10:12

## 2024-03-07 RX ADMIN — ATORVASTATIN CALCIUM 40 MG: 40 TABLET, FILM COATED ORAL at 21:35

## 2024-03-07 RX ADMIN — CEFTRIAXONE 1000 MG: 1 INJECTION, POWDER, FOR SOLUTION INTRAMUSCULAR; INTRAVENOUS at 21:45

## 2024-03-07 NOTE — PROGRESS NOTES
Physician Progress Note      PATIENT:               ALLEN DOMINGO  [facility] #:                  [id number]  :                       1945  ADMIT DATE:       3/3/2024 11:27 AM  DISCH DATE:  RESPONDING  PROVIDER #:        Alan Andrade MD          QUERY TEXT:    Patient admitted with Pneumonia. Documentation reflects Sepsis in ED note.  If   possible, please document in the progress notes and discharge summary if   Sepsis was:    The medical record reflects the following:  Risk Factors: Pneumonia  Clinical Indicators: during the ED course heart rate did spike to 103, WBC   13.5, LA 1.9, CAP (community acquired pneumonia) due to Chlamydia species per   H&P, D Dimer 1.41  Treatment: sepsis labs/cultures were added on as well as a 30 cc/kg bolus of   ideal body weight, ceftriaxone IV, CXR, CTA chest    Thank you, Asia Zepeda RN, CDS [phone]  Options provided:  -- Sepsis confirmed after study  -- Sepsis treated and resolved  -- Sepsis ruled out after study  -- Other - I will add my own diagnosis  -- Disagree - Not applicable / Not valid  -- Disagree - Clinically unable to determine / Unknown  -- Refer to Clinical Documentation Reviewer    PROVIDER RESPONSE TEXT:    Sepsis treated and resolved.    Query created by: Asia Zepeda on 3/4/2024 12:13 PM      QUERY TEXT:    Pt admitted with Pneumonia and fall.  Pt noted to have documented   rhabdomyolysis or clinical indicators of rhabdomyolysis i.e. hematuria,   elevated CK.  If possible, please document in progress notes and discharge   summary if you are evaluating and/or treating any of the following:    The medical record reflects the following:  Risk Factors: Rhabdo  Clinical Indicators: Mild rhabdomyolysis: Likely due to being on the floor for   more than 12 hours, red blood cells in urine 9, -278  Treatment: IV fluids, Labs, monitoring    Thank you, Asia Zepeda RN, CDS [phone]    Per [email or web address]  Traumatic  rhabdomyolysis cause examples: crush syndrome, prolonged   immobilization  Nontraumatic rhabdomyolysis cause examples:  marked exertion, hyperthermia,   metabolic myopathy, drugs or toxins, infections, electrolyte disorders.  Options provided:  -- Traumatic rhabdomyolysis  -- Nontraumatic rhabdomyolysis  -- Other - I will add my own diagnosis  -- Disagree - Not applicable / Not valid  -- Disagree - Clinically unable to determine / Unknown  -- Refer to Clinical Documentation Reviewer    PROVIDER RESPONSE TEXT:    This patient has nontraumatic rhabdomyolysis.    Query created by: Asia Zepeda on 3/4/2024 12:18 PM      Electronically signed by:  Alan Andrade MD 3/7/2024 12:39 PM

## 2024-03-07 NOTE — CARE COORDINATION
Chart reviewed and patient discussed in IDR. Possible DC later today if pro-gurmeet comes back WNL. + cultures. CM following. Patient will ne discharging to [facility].

## 2024-03-07 NOTE — PROGRESS NOTES
V2.0    [facility] [place]      Name:  Steve Humphries /Age/Sex: 1945  (78 y.o. male)   MRN & CSN:  [id number] & [id number] Encounter Date/Time: 3/7/2024 12:26 PM EST   Location:  [facility]A PCP: Janes Rai MD     Attending:Yulia Mireles MD       Hospital Day: 5    Assessment and Recommendations   Steve Humphries is a 78 y.o. male with pmh of Alzheimer's dementia, CVA who presents with CAP (community acquired pneumonia) due to Chlamydia species    Past Medical History:   Diagnosis Date    Acute CVA (cerebrovascular accident) (HCC) 10/07/2022    Dementia (HCC)     UTI (urinary tract infection)          Plan:   Bilateral lower extremity weakness-CT head and CTA negative for acute changes, recent MRI 2024 with subacute infarct in the right basal ganglia and the right occipital lobe, CTA: Chronic occlusion right vertebral artery origin.  Severe stenosis of the left vertebral artery origin bilateral proximal ICA stenosis right 70%, 50% on the left.   MRI brain: Subacute infarct in right basal ganglia/right frontal lobe. Multiple areas of chronic infarcts. start with statin and aspirin, TSH 3.16, B12 within normal limits  Patient is not a candidate for neurointervention at this time. Continue medical management   *Dementia-likely Alzheimer's, continue memantine  Acute hypoxemic respiratory failure-patient was placed on 4 L nasal cannula as reported to have oxygen dropped to 85% of room air in the emergency department, on admission patient saturating more than 95% of the room air, chest x-ray and CT PE with no acute infection or other concerning findings-will monitor off antibiotics and titrate oxygen as able, currently on RA since admission  Bcx Staph epidermidis +ve : 1/4 bottles , likely contaminant, follow repeat Bcx. Awaiting blood culture results and procalcitonin to determine disposition      Diet ADULT DIET; Regular; No Added Salt (3-4 gm)   DVT Prophylaxis [x] Lovenox, []  Heparin, [] SCDs, []  Ambulation,  [] Eliquis, [] Xarelto  [] Coumadin   Code Status Full Code   Disposition From: Home  Expected Disposition: SNF  Estimated Date of Discharge: 1-2 days  Patient requires continued admission due to Bcx   Surrogate Decision Maker/ POA       Personally reviewed Lab Studies and Imaging     Subjective:     Chief Complaint: Community-acquired pneumonia    Steve Humphries is a 78 y.o. male who presents with acute hypoxemic respiratory failure    Patient seen and evaluated at bedside.  No acute overnight events.  Spouse also at bedside.  Patient seen sitting in chair without any discomfort.  Patient denies any active complaints.  Tolerating diet.  Plan of care discussed at length.    Review of Systems:      Pertinent positives and negatives discussed in HPI    Objective:     Intake/Output Summary (Last 24 hours) at 3/7/2024 0834  Last data filed at 3/6/2024 0851  Gross per 24 hour   Intake 240 ml   Output --   Net 240 ml        Vitals:   Vitals:    03/06/24 0830 03/06/24 1220 03/06/24 1359 03/06/24 2155   BP: (!) 161/69  (!) 165/67 (!) 157/73   Pulse: 87  89 68   Resp: 19 23 18   Temp: 97.9 °F (36.6 °C)  97.3 °F (36.3 °C) 98 °F (36.7 °C)   TempSrc: Oral  Oral Oral   SpO2:  93% 95% 93%   Weight:       Height:             Physical Exam:      General: NAD  Eyes: EOMI  ENT: neck supple  Cardiovascular: Regular rate.  Respiratory: Clear to auscultation  Gastrointestinal: Soft, non tender  Genitourinary: no suprapubic tenderness  Musculoskeletal: No edema  Skin: warm, dry  Neuro: Alert.  Psych: Mood appropriate.         Medications:   Medications:    cefTRIAXone (ROCEPHIN) IV  1,000 mg IntraVENous Q24H    memantine  10 mg Oral BID    sodium chloride flush  5-40 mL IntraVENous 2 times per day    enoxaparin  40 mg SubCUTAneous Daily    aspirin  81 mg Oral Daily    atorvastatin  40 mg Oral Nightly      Infusions:    sodium chloride       PRN Meds: sodium chloride flush, 5-40 mL, PRN  sodium chloride, , PRN  potassium

## 2024-03-07 NOTE — PROGRESS NOTES
Vascular/Interventional Neurology Progress Note  [facility]  Patient Name: Steve Humphries     : 1945      Subjective:     The patient was seen and examined.  Chart reviewed.  The patient is sitting up in the chair.  Denies any neurological changes.  His wife is present at bedside denies further needs        Objective:   Scheduled Meds:   cefTRIAXone (ROCEPHIN) IV  1,000 mg IntraVENous Q24H    memantine  10 mg Oral BID    sodium chloride flush  5-40 mL IntraVENous 2 times per day    enoxaparin  40 mg SubCUTAneous Daily    aspirin  81 mg Oral Daily    atorvastatin  40 mg Oral Nightly     Continuous Infusions:   sodium chloride       PRN Meds:.sodium chloride flush, sodium chloride, potassium chloride **OR** potassium alternative oral replacement **OR** potassium chloride, magnesium sulfate, ondansetron **OR** ondansetron, polyethylene glycol, acetaminophen **OR** acetaminophen    Vital Signs:  [unfilled]     General: Alert to name, unsure of location and year.  Follows simple commands  HEENT: NC/AT, EOMI, PERRL, mmm, neck supple  Extremities: no edema, no calf tenderness b/l    Neurological Exam:         Mental Status: Alert to name, unsure of location and year.  Recognizes his wife.  NAD, speech clear, language fluent, repetition and naming intact, follows commands appropriately     Cranial Nerves: Right homonymous hemianopsia otherwise CN II-XII intact     Sensation: intact LT/temp UE/LE's b/l     Motor: 5/5 UE/LE's b/l, tone and bulk normal, no pronator drift     Reflexes: 2/4 biceps, triceps, brachioradialis, patellar, and achilles bilaterally; flexor plantar responses bilaterally     Coordination:       Tremors-- None       Rapidly alternating movements (ALBER): No dysdiadonchokinesis b/l     Gait/Station: Deferred    Labs:   Recent Labs     24  0554 24  2347   WBC 9.2 9.6    141   K 4.0 4.5    107   CO2 23 25   BUN 11 9   CREATININE 1.2 1.1   GLUCOSE 103* 101*  infarcts.      -Pertinent images discussed/reviewed with Dr. Wiggins who has fully participated in the care of this patient and agrees with plan.  -Patient is not a candidate for neurointervention at this time.  Continue medical management  -Continue aspirin and atorvastatin for secondary prevention of stroke  -Pneumonia management per primary  -PT/OT as able.  Plan for [facility] at discharge    Electronically signed by PAOLA Yusuf CNP on 3/7/2024 at 8:46 AM   3/7/2024

## 2024-03-07 NOTE — PROGRESS NOTES
Physical Therapy Treatment Note  Name: Steve Humphries MRN: [id number] :   1945   Date:  3/7/2024   Ad[facility] Date: 3/3/2024 [facility]   Restrictions/Precautions:  General precautions, fall risk, sitter   Communication with other providers:  Pt okay to see for therapy per RN   Subjective:  Patient states:  \"Do you want to be my friend?\" And pt perseverating on finding out where his wife is and when she is coming. PTA assisted pt on contacting wife on phone and wife arriving shortly.   Pain:   Location, Type, Intensity (0/10 to 10/10):  Denies   Objective:    Observation:  Pt long sitting in bed upon PTA arrival.   Objective Measures:  No tele, pt is confused at baseline (Alzheimer's)   Treatment, including education/measures:    Therapeutic Activity Training:   Therapeutic activity training was instructed today.  Cues were given for safety, sequence, UE/LE placement, awareness, and balance.    Activities performed today included bed mobility training, sup-sit, sit-stand, SPT.    Mobility:  Sup > sit: SBA with short and direct cues.   Scooting: SBA   STS: CGA for safety from EOB to RW.     Gait:  Pt ambulated 175ft x 2 with RW and CGA for safety. Pt presents with kyphotic posture, shuffling gait, decreased gait speed, very distractible in hallway dt commotion in hallway, toe-out gait, knee flexion, confusion with navigation and requires intermittent reinforcement for direction.     Exercises:  Pt performed seated APs , knee ext, marching 1 x 10 ea. LE, shoulder flexion 1 x 10 ea,. UE and pt presents with diminishing initiation and distractibility and exercises concluded.    Safety:   Pt returned safely to recliner with chair alarm activated, call light in reach, all needs met.    Assessment / Impression:    Pt tolerated OOB activities well this date, progression and therex limited by cognition.   Patient's tolerance of treatment:  Good   Adverse Reaction: none  Significant change in status and impact:   none  Barriers to improvement:  Cognition  Plan for Next Session:    Plan to continue OOB activities.   Time in:  0926  Time out:  0944  Timed treatment minutes:  18  Total treatment time:  18    Previously filed items:  Social/Functional History  Lives With: Spouse  Type of Home: House  Home Layout: Two level, Bed/Bath upstairs, Able to Live on Main level with bedroom/bathroom  Home Access: Level entry  Bathroom Shower/Tub: Walk-in shower  Bathroom Toilet: Standard  Bathroom Equipment: Shower chair  Home Equipment: Walker, rolling  Receives Help From: Family  ADL Assistance: Independent  Homemaking Assistance: Independent  Ambulation Assistance: Independent  Transfer Assistance: Independent  Additional Comments: Pt not a reliable historian this date, would recommend verifiying with family member        Short Term Goals  Time Frame for Short Term Goals: 1 week  Short Term Goal 1: Pt will complete bed mobility w mod I  Short Term Goal 2: Pt will complete STS transfer w LRAD SBA  Short Term Goal 3: Pt will ambualete 75' w LRAD SBA    Electronically signed by:    Amy Barrera PTA  3/7/2024, 9:48 AM

## 2024-03-08 VITALS
OXYGEN SATURATION: 94 % | WEIGHT: 195.77 LBS | RESPIRATION RATE: 16 BRPM | TEMPERATURE: 97.5 F | BODY MASS INDEX: 27.41 KG/M2 | DIASTOLIC BLOOD PRESSURE: 88 MMHG | HEIGHT: 71 IN | HEART RATE: 86 BPM | SYSTOLIC BLOOD PRESSURE: 154 MMHG

## 2024-03-08 LAB
CULTURE: ABNORMAL
CULTURE: ABNORMAL
GLUCOSE BLD-MCNC: 95 MG/DL (ref 70–99)
Lab: ABNORMAL
SPECIMEN: ABNORMAL

## 2024-03-08 PROCEDURE — 97116 GAIT TRAINING THERAPY: CPT

## 2024-03-08 PROCEDURE — 97110 THERAPEUTIC EXERCISES: CPT

## 2024-03-08 PROCEDURE — 6370000000 HC RX 637 (ALT 250 FOR IP): Performed by: STUDENT IN AN ORGANIZED HEALTH CARE EDUCATION/TRAINING PROGRAM

## 2024-03-08 PROCEDURE — 82962 GLUCOSE BLOOD TEST: CPT

## 2024-03-08 PROCEDURE — 94761 N-INVAS EAR/PLS OXIMETRY MLT: CPT

## 2024-03-08 RX ORDER — AMLODIPINE BESYLATE 5 MG/1
5 TABLET ORAL DAILY
Qty: 30 TABLET | Refills: 3
Start: 2024-03-08

## 2024-03-08 RX ORDER — ATORVASTATIN CALCIUM 40 MG/1
40 TABLET, FILM COATED ORAL NIGHTLY
Qty: 30 TABLET | Refills: 3
Start: 2024-03-08

## 2024-03-08 RX ORDER — ASPIRIN 81 MG/1
81 TABLET, CHEWABLE ORAL DAILY
Qty: 30 TABLET | Refills: 3
Start: 2024-03-08

## 2024-03-08 RX ORDER — POLYETHYLENE GLYCOL 3350 17 G/17G
17 POWDER, FOR SOLUTION ORAL DAILY PRN
Qty: 30 PACKET | Refills: 0
Start: 2024-03-08 | End: 2024-04-07

## 2024-03-08 RX ADMIN — ASPIRIN 81 MG: 81 TABLET, CHEWABLE ORAL at 10:36

## 2024-03-08 RX ADMIN — AMLODIPINE BESYLATE 5 MG: 5 TABLET ORAL at 10:36

## 2024-03-08 RX ADMIN — MEMANTINE 10 MG: 10 TABLET ORAL at 10:36

## 2024-03-08 NOTE — DISCHARGE SUMMARY
MEN 50+ PO            ASK your doctor about these medications      ibuprofen 200 MG tablet  Commonly known as: ADVIL;MOTRIN     traMADol 50 MG tablet  Commonly known as: ULTRAM               Where to Get Your Medications        Information about where to get these medications is not yet available    Ask your nurse or doctor about these medications  amLODIPine 5 MG tablet  aspirin 81 MG chewable tablet  atorvastatin 40 MG tablet  polyethylene glycol 17 g packet        Objective Findings at Discharge:   /63   Pulse 84   Temp 98.2 °F (36.8 °C) (Oral)   Resp 16   Ht 1.803 m (5' 11\")   Wt 88.8 kg (195 lb 12.3 oz)   SpO2 95%   BMI 27.30 kg/m²       Physical Exam:   Physical Exam  Vitals and nursing note reviewed.   Constitutional:       General: He is not in acute distress.     Appearance: Normal appearance. He is not ill-appearing.   HENT:      Head: Normocephalic and atraumatic.      Mouth/Throat:      Mouth: Mucous membranes are moist.   Eyes:      Extraocular Movements: Extraocular movements intact.      Pupils: Pupils are equal, round, and reactive to light.   Cardiovascular:      Rate and Rhythm: Normal rate and regular rhythm.      Pulses: Normal pulses.      Heart sounds: Normal heart sounds.   Pulmonary:      Effort: Pulmonary effort is normal.      Breath sounds: Normal breath sounds.   Abdominal:      Palpations: Abdomen is soft.   Musculoskeletal:         General: No tenderness or signs of injury. Normal range of motion.   Skin:     General: Skin is warm.      Capillary Refill: Capillary refill takes less than 2 seconds.   Neurological:      General: No focal deficit present.      Mental Status: He is alert and oriented to person, place, and time.   Psychiatric:         Mood and Affect: Mood normal.         Behavior: Behavior normal.         Thought Content: Thought content normal.         Judgment: Judgment normal.              Labs and Imaging   MRI BRAIN WO CONTRAST    Result Date:  the right posterior cerebral artery. There is remote lacunar infarct identified with the right basal ganglia. There are changes of encephalomalacia identified with the posterior cortex of the right occipital lobe consistent with the sequelae of remote ischemic infarct. No intracranial hemorrhage is identified.  IMPRESSION: 1. No evidence of any intracranial large vessel occlusion. Electronically signed by Román Arrington MD    CT HEAD WO CONTRAST    Result Date: 3/3/2024  HEAD CT SCAN WITHOUT CONTRAST HISTORY:  Fall. Altered mental status. Bilateral lower extremity weakness. COMPARISON:  Head CT dated 2/5/2024 TECHNIQUE:  Noncontrast CT images of the head were obtained. Up to date CT equipment and radiation dose reduction techniques were employed. FINDINGS: There is prominence of the ventricles, sulci and cisterns. There are some patchy and confluent areas of decreased periventricular white matter attenuation.  No intracranial hemorrhage is identified. No mass or mass-effect is identified. The gray-white differentiation is intact. There is no CT evidence of acute ischemia.   There is some encephalomalacia identified with the right parietal lobe cortex consistent with the sequelae of remote ischemic infarct. Remote lacunar infarct is present within the right basal ganglia. There is mucosal thickening involving multiple ethmoid air cells. There is mucosal thickening identified within the left maxillary sinus. There is air-fluid level mucosal thickening identified within the leftward aspect of the frontal sinus.     1. Moderate atrophy. 2. Patchy and confluent areas of decreased white matter attenuation.  The findings are nonspecific, but most likely represent chronic small vessel ischemic change. 3. Remote right parietal lobe cortical infarct. 4. Sinusitis. 5. Right basal ganglia remote lacunar infarct. 6. No acute intracranial process. Electronically signed by Román Arrington MD    XR CHEST PORTABLE    Result

## 2024-03-08 NOTE — DISCHARGE INSTR - COC
Continuity of Care Form    Patient Name: Steve Humphries   :  1945  MRN:  [id number]    Admit date:  3/3/2024  Discharge date:  3/8/2024    Code Status Order: Full Code   Advance Directives:     Admitting Physician:  No admitting provider for patient encounter.  PCP: Janes Rai MD    Discharging Nurse: [facility] Unit/Room#: 1105/1105-A  Discharging Unit Phone Number: [phone]    Emergency Contact:   Extended Emergency Contact Information  Primary Emergency Contact: Monalisa Humphries  Home Phone: [phone]  Relation: Spouse  Preferred language: English   needed? No    Past Surgical History:  Past Surgical History:   Procedure Laterality Date    APPENDECTOMY      HERNIA REPAIR      TURP N/A 2022    CYSTOSCOPY TRANSURETHRAL RESECTION PROSTATE performed by Neo Car MD at [facility] OR       Immunization History:   Immunization History   Administered Date(s) Administered    COVID-19, PFIZER PURPLE top, DILUTE for use, (age 12 y+), 30mcg/0.3mL 2021, 2021       Active Problems:  Patient Active Problem List   Diagnosis Code    Enlarged prostate with lower urinary tract symptoms (LUTS) N40.1    Benign prostatic hyperplasia with urinary frequency N40.1, R35.0    Vertigo R42    Stage 3b chronic kidney disease (HCC) N18.32    Hypercalcemia E83.52    Chronic kidney disease, stage 3a (HCC) N18.31    Acute CVA (cerebrovascular accident) ([facility]) I63.9    Ataxia R27.0    Right homonymous hemianopsia H53.461    Aphasia due to acute stroke ([facility]) I63.9, R47.01    Alzheimer's disease, unspecified G30.9    CAP (community acquired pneumonia) due to Chlamydia species J16.0    Weakness of both lower extremities R29.898       Isolation/Infection:   Isolation            No Isolation          Patient Infection Status       None to display                     Nurse Assessment:  Last Vital Signs: /63   Pulse 84   Temp 98.2 °F (36.8 °C) (Oral)   Resp 16   Ht 1.803 m (5' 11\")   Wt 88.8  Facility/ Agency   Name:   Address:  Phone:  Fax:    Dialysis Facility (if applicable)   Name:  Address:  Dialysis Schedule:  Phone:  Fax:    / signature: {Esignature:[id number]}    PHYSICIAN SECTION    Prognosis: Good    Condition at Discharge: Stable    Rehab Potential (if transferring to Rehab): Good    Recommended Labs or Other Treatments After Discharge:     Physician Certification: I certify the above information and transfer of Steve Humphries  is necessary for the continuing treatment of the diagnosis listed and that he requires Skilled Nursing Facility for greater 30 days.     Update Admission H&P: No change in H&P    PHYSICIAN SIGNATURE:  Electronically signed by Yulia Mireles MD on 3/8/24 at 8:07 AM EST

## 2024-03-08 NOTE — CARE COORDINATION
DC order is in. Transport scheduled for 11am. I have left a VM with Ellyn with [facility] about pt's transport time. Charge nurse and Patient and wife are also aware of transport time. Packet with charts.

## 2024-03-09 LAB
CULTURE: ABNORMAL
CULTURE: ABNORMAL
Lab: ABNORMAL
SPECIMEN: ABNORMAL

## 2024-03-10 LAB
CULTURE: NORMAL
Lab: NORMAL
SPECIMEN: NORMAL

## 2024-03-11 ENCOUNTER — CARE COORDINATION (OUTPATIENT)
Dept: CARE COORDINATION | Age: 79
End: 2024-03-11

## 2024-03-11 NOTE — CARE COORDINATION
SECURE email notification sent to identified IDT members at [facility].

## 2024-04-02 ENCOUNTER — HOSPITAL ENCOUNTER (INPATIENT)
Age: 79
LOS: 2 days | Discharge: NURSING, REHAB OR LONG-TERM CARE | DRG: 065 | End: 2024-04-04
Attending: EMERGENCY MEDICINE | Admitting: STUDENT IN AN ORGANIZED HEALTH CARE EDUCATION/TRAINING PROGRAM
Payer: MEDICARE

## 2024-04-02 ENCOUNTER — APPOINTMENT (OUTPATIENT)
Dept: CT IMAGING | Age: 79
DRG: 065 | End: 2024-04-02
Attending: EMERGENCY MEDICINE
Payer: MEDICARE

## 2024-04-02 ENCOUNTER — APPOINTMENT (OUTPATIENT)
Dept: MRI IMAGING | Age: 79
DRG: 065 | End: 2024-04-02
Payer: MEDICARE

## 2024-04-02 ENCOUNTER — APPOINTMENT (OUTPATIENT)
Dept: GENERAL RADIOLOGY | Age: 79
DRG: 065 | End: 2024-04-02
Payer: MEDICARE

## 2024-04-02 DIAGNOSIS — I63.9 CEREBROVASCULAR ACCIDENT (CVA), UNSPECIFIED MECHANISM (HCC): Primary | ICD-10-CM

## 2024-04-02 DIAGNOSIS — R29.898 LEFT ARM WEAKNESS: ICD-10-CM

## 2024-04-02 DIAGNOSIS — I63.131 STROKE DUE TO EMBOLISM OF RIGHT CAROTID ARTERY (HCC): ICD-10-CM

## 2024-04-02 LAB
ALBUMIN SERPL-MCNC: 3.6 GM/DL (ref 3.4–5)
ALP BLD-CCNC: 79 IU/L (ref 40–128)
ALT SERPL-CCNC: 20 U/L (ref 10–40)
ANION GAP SERPL CALCULATED.3IONS-SCNC: 9 MMOL/L (ref 7–16)
AST SERPL-CCNC: 22 IU/L (ref 15–37)
BASOPHILS ABSOLUTE: 0 K/CU MM
BASOPHILS RELATIVE PERCENT: 0.4 % (ref 0–1)
BILIRUB SERPL-MCNC: 0.7 MG/DL (ref 0–1)
BUN SERPL-MCNC: 20 MG/DL (ref 6–23)
CALCIUM SERPL-MCNC: 8.8 MG/DL (ref 8.3–10.6)
CHLORIDE BLD-SCNC: 101 MMOL/L (ref 99–110)
CHP ED QC CHECK: NORMAL
CO2: 25 MMOL/L (ref 21–32)
CREAT SERPL-MCNC: 1.3 MG/DL (ref 0.9–1.3)
DIFFERENTIAL TYPE: ABNORMAL
EOSINOPHILS ABSOLUTE: 0.4 K/CU MM
EOSINOPHILS RELATIVE PERCENT: 5.4 % (ref 0–3)
GFR SERPL CREATININE-BSD FRML MDRD: 57 ML/MIN/1.73M2
GLUCOSE BLD-MCNC: 89 MG/DL
GLUCOSE BLD-MCNC: 89 MG/DL (ref 70–99)
GLUCOSE SERPL-MCNC: 99 MG/DL (ref 70–99)
HCT VFR BLD CALC: 39.8 % (ref 42–52)
HEMOGLOBIN: 12.6 GM/DL (ref 13.5–18)
IMMATURE NEUTROPHIL %: 0.4 % (ref 0–0.43)
INR BLD: 1.1 INDEX
LYMPHOCYTES ABSOLUTE: 3 K/CU MM
LYMPHOCYTES RELATIVE PERCENT: 40.3 % (ref 24–44)
MAGNESIUM: 1.9 MG/DL (ref 1.8–2.4)
MCH RBC QN AUTO: 30 PG (ref 27–31)
MCHC RBC AUTO-ENTMCNC: 31.7 % (ref 32–36)
MCV RBC AUTO: 94.8 FL (ref 78–100)
MONOCYTES ABSOLUTE: 1.1 K/CU MM
MONOCYTES RELATIVE PERCENT: 14.4 % (ref 0–4)
NUCLEATED RBC %: 0 %
PDW BLD-RTO: 14 % (ref 11.7–14.9)
PLATELET # BLD: 255 K/CU MM (ref 140–440)
PMV BLD AUTO: 9.8 FL (ref 7.5–11.1)
POTASSIUM SERPL-SCNC: 3.4 MMOL/L (ref 3.5–5.1)
PROTHROMBIN TIME: 14.4 SECONDS (ref 11.7–14.5)
RBC # BLD: 4.2 M/CU MM (ref 4.6–6.2)
SEGMENTED NEUTROPHILS ABSOLUTE COUNT: 2.9 K/CU MM
SEGMENTED NEUTROPHILS RELATIVE PERCENT: 39.1 % (ref 36–66)
SODIUM BLD-SCNC: 135 MMOL/L (ref 135–145)
TOTAL IMMATURE NEUTOROPHIL: 0.03 K/CU MM
TOTAL NUCLEATED RBC: 0 K/CU MM
TOTAL PROTEIN: 6.4 GM/DL (ref 6.4–8.2)
TROPONIN, HIGH SENSITIVITY: 20 NG/L (ref 0–22)
TROPONIN, HIGH SENSITIVITY: 21 NG/L (ref 0–22)
WBC # BLD: 7.4 K/CU MM (ref 4–10.5)

## 2024-04-02 PROCEDURE — 6370000000 HC RX 637 (ALT 250 FOR IP): Performed by: INTERNAL MEDICINE

## 2024-04-02 PROCEDURE — 85025 COMPLETE CBC W/AUTO DIFF WBC: CPT

## 2024-04-02 PROCEDURE — 93005 ELECTROCARDIOGRAM TRACING: CPT | Performed by: EMERGENCY MEDICINE

## 2024-04-02 PROCEDURE — 85610 PROTHROMBIN TIME: CPT

## 2024-04-02 PROCEDURE — 1200000000 HC SEMI PRIVATE

## 2024-04-02 PROCEDURE — 71045 X-RAY EXAM CHEST 1 VIEW: CPT

## 2024-04-02 PROCEDURE — 99285 EMERGENCY DEPT VISIT HI MDM: CPT

## 2024-04-02 PROCEDURE — 84484 ASSAY OF TROPONIN QUANT: CPT

## 2024-04-02 PROCEDURE — 80053 COMPREHEN METABOLIC PANEL: CPT

## 2024-04-02 PROCEDURE — 70498 CT ANGIOGRAPHY NECK: CPT

## 2024-04-02 PROCEDURE — 4A03X5D MEASUREMENT OF ARTERIAL FLOW, INTRACRANIAL, EXTERNAL APPROACH: ICD-10-PCS | Performed by: STUDENT IN AN ORGANIZED HEALTH CARE EDUCATION/TRAINING PROGRAM

## 2024-04-02 PROCEDURE — 70551 MRI BRAIN STEM W/O DYE: CPT

## 2024-04-02 PROCEDURE — 83735 ASSAY OF MAGNESIUM: CPT

## 2024-04-02 PROCEDURE — 70450 CT HEAD/BRAIN W/O DYE: CPT

## 2024-04-02 PROCEDURE — 82962 GLUCOSE BLOOD TEST: CPT

## 2024-04-02 PROCEDURE — 6360000004 HC RX CONTRAST MEDICATION: Performed by: EMERGENCY MEDICINE

## 2024-04-02 PROCEDURE — 2580000003 HC RX 258: Performed by: INTERNAL MEDICINE

## 2024-04-02 RX ORDER — HYDROXYZINE HYDROCHLORIDE 25 MG/1
25 TABLET, FILM COATED ORAL EVERY 6 HOURS PRN
COMMUNITY

## 2024-04-02 RX ORDER — SODIUM CHLORIDE 9 MG/ML
INJECTION, SOLUTION INTRAVENOUS PRN
Status: DISCONTINUED | OUTPATIENT
Start: 2024-04-02 | End: 2024-04-04 | Stop reason: HOSPADM

## 2024-04-02 RX ORDER — M-VIT,TX,IRON,MINS/CALC/FOLIC 27MG-0.4MG
1 TABLET ORAL DAILY
Status: DISCONTINUED | OUTPATIENT
Start: 2024-04-03 | End: 2024-04-04 | Stop reason: HOSPADM

## 2024-04-02 RX ORDER — SODIUM CHLORIDE 0.9 % (FLUSH) 0.9 %
5-40 SYRINGE (ML) INJECTION EVERY 12 HOURS SCHEDULED
Status: DISCONTINUED | OUTPATIENT
Start: 2024-04-02 | End: 2024-04-04 | Stop reason: HOSPADM

## 2024-04-02 RX ORDER — POLYETHYLENE GLYCOL 3350 17 G/17G
17 POWDER, FOR SOLUTION ORAL DAILY PRN
Status: DISCONTINUED | OUTPATIENT
Start: 2024-04-02 | End: 2024-04-04 | Stop reason: HOSPADM

## 2024-04-02 RX ORDER — ONDANSETRON 2 MG/ML
4 INJECTION INTRAMUSCULAR; INTRAVENOUS EVERY 6 HOURS PRN
Status: DISCONTINUED | OUTPATIENT
Start: 2024-04-02 | End: 2024-04-04 | Stop reason: HOSPADM

## 2024-04-02 RX ORDER — ENOXAPARIN SODIUM 100 MG/ML
40 INJECTION SUBCUTANEOUS DAILY
Status: DISCONTINUED | OUTPATIENT
Start: 2024-04-03 | End: 2024-04-04 | Stop reason: HOSPADM

## 2024-04-02 RX ORDER — SODIUM CHLORIDE 0.9 % (FLUSH) 0.9 %
5-40 SYRINGE (ML) INJECTION PRN
Status: DISCONTINUED | OUTPATIENT
Start: 2024-04-02 | End: 2024-04-04 | Stop reason: HOSPADM

## 2024-04-02 RX ORDER — ATORVASTATIN CALCIUM 40 MG/1
40 TABLET, FILM COATED ORAL NIGHTLY
Status: DISCONTINUED | OUTPATIENT
Start: 2024-04-02 | End: 2024-04-04 | Stop reason: HOSPADM

## 2024-04-02 RX ORDER — DIVALPROEX SODIUM 125 MG/1
125 CAPSULE, COATED PELLETS ORAL 2 TIMES DAILY
Status: DISCONTINUED | OUTPATIENT
Start: 2024-04-02 | End: 2024-04-04 | Stop reason: HOSPADM

## 2024-04-02 RX ORDER — ONDANSETRON 4 MG/1
4 TABLET, ORALLY DISINTEGRATING ORAL EVERY 8 HOURS PRN
Status: DISCONTINUED | OUTPATIENT
Start: 2024-04-02 | End: 2024-04-04 | Stop reason: HOSPADM

## 2024-04-02 RX ORDER — LORAZEPAM 0.5 MG/1
0.5 TABLET ORAL
Status: COMPLETED | OUTPATIENT
Start: 2024-04-02 | End: 2024-04-02

## 2024-04-02 RX ORDER — DIVALPROEX SODIUM 125 MG/1
125 TABLET, DELAYED RELEASE ORAL 2 TIMES DAILY
COMMUNITY

## 2024-04-02 RX ORDER — DIVALPROEX SODIUM 125 MG/1
125 TABLET, DELAYED RELEASE ORAL 2 TIMES DAILY
Status: DISCONTINUED | OUTPATIENT
Start: 2024-04-02 | End: 2024-04-02 | Stop reason: CLARIF

## 2024-04-02 RX ORDER — MEMANTINE HYDROCHLORIDE 10 MG/1
10 TABLET ORAL 2 TIMES DAILY
Status: DISCONTINUED | OUTPATIENT
Start: 2024-04-02 | End: 2024-04-04 | Stop reason: HOSPADM

## 2024-04-02 RX ORDER — ASPIRIN 81 MG/1
81 TABLET, CHEWABLE ORAL DAILY
Status: DISCONTINUED | OUTPATIENT
Start: 2024-04-03 | End: 2024-04-04 | Stop reason: HOSPADM

## 2024-04-02 RX ORDER — LANOLIN ALCOHOL/MO/W.PET/CERES
3 CREAM (GRAM) TOPICAL NIGHTLY
Status: DISCONTINUED | OUTPATIENT
Start: 2024-04-02 | End: 2024-04-04 | Stop reason: HOSPADM

## 2024-04-02 RX ORDER — POTASSIUM CHLORIDE 7.45 MG/ML
10 INJECTION INTRAVENOUS
Status: DISCONTINUED | OUTPATIENT
Start: 2024-04-02 | End: 2024-04-02

## 2024-04-02 RX ADMIN — POTASSIUM BICARBONATE 40 MEQ: 782 TABLET, EFFERVESCENT ORAL at 16:01

## 2024-04-02 RX ADMIN — SODIUM CHLORIDE, PRESERVATIVE FREE 10 ML: 5 INJECTION INTRAVENOUS at 22:23

## 2024-04-02 RX ADMIN — IOPAMIDOL 80 ML: 755 INJECTION, SOLUTION INTRAVENOUS at 07:45

## 2024-04-02 RX ADMIN — MEMANTINE 10 MG: 10 TABLET ORAL at 22:22

## 2024-04-02 RX ADMIN — ATORVASTATIN CALCIUM 40 MG: 40 TABLET, FILM COATED ORAL at 22:22

## 2024-04-02 RX ADMIN — LORAZEPAM 0.5 MG: 0.5 TABLET ORAL at 13:47

## 2024-04-02 RX ADMIN — Medication 3 MG: at 22:22

## 2024-04-02 RX ADMIN — DIVALPROEX SODIUM 125 MG: 125 CAPSULE, COATED PELLETS ORAL at 22:22

## 2024-04-02 ASSESSMENT — PAIN SCALES - GENERAL: PAINLEVEL_OUTOF10: 0

## 2024-04-02 NOTE — ED NOTES
Medication History  [facility]    Patient Name: Steve Humphries 1945     Medication history has been completed by: Kelly Kc CPhT    Source(s) of information: medication list provided by [facility]     Primary Care Physician: Janes Rai MD     Pharmacy:     Allergies as of 04/02/2024 - Fully Reviewed 04/02/2024   Allergen Reaction Noted    Tylenol [acetaminophen]  07/20/2023        Prior to Admission medications    Medication Sig Start Date End Date Taking? Authorizing Provider   divalproex (DEPAKOTE) 125 MG DR tablet Take 1 tablet by mouth 2 times daily   Yes Kirk Espinosa MD   hydrOXYzine HCl (ATARAX) 25 MG tablet Take 1 tablet by mouth every 6 hours as needed for Itching   Yes Kirk Espinosa MD   melatonin 3 MG TABS tablet Take 1 tablet by mouth at bedtime    Kirk Espinosa MD   amLODIPine (NORVASC) 5 MG tablet Take 1 tablet by mouth daily  Patient taking differently: Take 2 tablets by mouth daily 3/8/24   Yulia Mireles MD   aspirin 81 MG chewable tablet Take 1 tablet by mouth daily 3/8/24   Yulia Mireles MD   atorvastatin (LIPITOR) 40 MG tablet Take 1 tablet by mouth nightly 3/8/24   Yulia Mireles MD   polyethylene glycol (GLYCOLAX) 17 g packet Take 1 packet by mouth daily as needed for Constipation 3/8/24 4/7/24  Yulia Mireles MD   memantine (NAMENDA) 10 MG tablet Take 1 tablet by mouth 2 times daily NOTE TO PHARMACY: DO NOT FILL UNTIL 5 MG RX IS COMPLETED 1/12/24   Brad Talbot W, DO   Multiple Vitamins-Minerals (MULTIVITAMIN MEN 50+ PO) Take 1 tablet by mouth daily    ProviderKirk MD     Medications added or changed (ex. new medication, dosage change, interval change, formulation change):  Divalproex DR (added)  Hydroxyzine hcl prn (added)    Medications removed from list (include reason, ex. noncompliance, medication cost, therapy complete etc.):   IBU not on med list per nursing facility    Comments:  Medication list provided by Maryann

## 2024-04-02 NOTE — ED PROVIDER NOTES
over to try and find my hand, but does not look to the left, does not move his left arm [KA]   0733 Called by radiology- head CT without contrast is unchanged. Lots of white matter change [KA]   0802 Spoke with Dr. Jeronimo on telestroke [KA]      ED Course User Index  [KA] Alexandrea Kat MD     Patient CT angio was unchanged from previous.  We do suspect he had a stroke today, I did speak with telestroke team, with initial evaluation he is not a TNK candidate given recent stroke, as well as unclear last known well but it sounds like it was likely yesterday.  He does appear to have had a stroke.  CTA is unchanged from previous and at baseline he is very weak, bilateral leg weakness, not getting up and around or doing well over the recent months, some waxing and waning baseline, would likely not be an interventional candidate.  Plan to admit here for further stroke workup.  He and family are comfortable with plan      Discussion with Other Profesionals : Admitting Team discussed via PerfectServe withDr. Sanchez and Chetan. Accepted to hospitalist team.     Total critical care time today provided was 35 minutes. This excludes seperately billable procedure. Critical care time provided for stroke alert that required close evaluation and/or intervention with concern for patient decompensation.        Clinical Impression:  1. Cerebrovascular accident (CVA), unspecified mechanism (HCC)    2. Left arm weakness    3. 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  Disposition medications (if applicable):  New Prescriptions    No medications on file     ED Provider Disposition Time  DISPOSITION Admitted 04/02/2024 11:39:26 AM      Comment: Please note this report has been produced using speech recognition software and may contain errors related to that system including errors in grammar, punctuation, and spelling, as well as words and phrases

## 2024-04-02 NOTE — ED NOTES
ED TO INPATIENT SBAR HANDOFF    Patient Name: Steve Humphries   :  1945  79 y.o.   Preferred Name    Family/Caregiver Present yes   Restraints no   C-SSRS:    Sitter no   Sepsis Risk Score Sepsis Risk Score: 0.8      Situation  Chief Complaint   Patient presents with    Extremity Weakness     Left sided arm weakness and neglect, unknown LKW     Brief Description of Patient's Condition: Pt presented to ED from nursing home with new onset left arm weakness/neglect and left sided facial droop. LKW unknown. Pt has hx dementia and previous stroke. Pt has DNR.  Mental Status: disoriented  Arrived from: nursing home    Imaging:   XR CHEST PORTABLE   Final Result   Stable appearance of the chest persistent left basilar airspace disease.      Stable relative elevation of the left hemidiaphragm.      Electronically signed by Hilary Forrester DO      CTA HEAD NECK W CONTRAST   Final Result      CTA Head:   No arterial steno-occlusive disease or aneurysm      CTA Neck:   Unchanged bilateral proximal internal carotid artery stenosis up to 70% on the   right and 50% on the left.   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   Unchanged severe stenosis of the left vertebral artery origin.         CT HEAD WO CONTRAST   Final Result   No CT evidence of an acute intracranial hemorrhage or acute large vessel territory infarction.      Stable appearance of the brain with redemonstration of patchy confluent periventricular and subcortical white matter hypodensities. This is nonspecific however most likely reflects sequela from chronic microvascular ischemic disease. Given the extensive    periventricular and subcortical white matter hypoattenuation, this can mask developing infarction. The need for short-term CT follow-up versus MRI should be determined on a clinical basis.      Moderate cortical atrophy.      Remote right parietal lobe cortical infarct.      Remote

## 2024-04-02 NOTE — PLAN OF CARE
Problem: Confusion  Goal: Confusion, delirium, dementia, or psychosis is improved or at baseline  Description: INTERVENTIONS:  1. Assess for possible contributors to thought disturbance, including medications, impaired vision or hearing, underlying metabolic abnormalities, dehydration, psychiatric diagnoses, and notify attending LIP  2. [place] high risk fall precautions, as indicated  3. Provide frequent short contacts to provide reality reorientation, refocusing and direction  4. Decrease environmental stimuli, including noise as appropriate  5. Monitor and intervene to maintain adequate nutrition, hydration, elimination, sleep and activity  6. If unable to ensure safety without constant attention obtain sitter and review sitter guidelines with assigned personnel  7. Initiate Psychosocial CNS and Spiritual Care consult, as indicated  4/2/2024 1838 by Mallory Guillaume RN  Outcome: Not Progressing  4/2/2024 1815 by Opal King RN  Outcome: Progressing  4/2/2024 1734 by Opal King RN  Outcome: Progressing     Problem: Risk for Elopement  Goal: Patient will not exit the unit/facility without proper excort  4/2/2024 1838 by Mallory Guillaume RN  Outcome: Not Progressing  4/2/2024 1734 by Opal King RN  Outcome: Progressing     Problem: Chronic Conditions and Co-morbidities  Goal: Patient's chronic conditions and co-morbidity symptoms are monitored and maintained or improved  4/2/2024 1838 by Mallory Guillaume RN  Outcome: Progressing  4/2/2024 1734 by Opal King RN  Outcome: Progressing     Problem: Discharge Planning  Goal: Discharge to home or other facility with appropriate resources  4/2/2024 1838 by Mallory Guillaume RN  Outcome: Progressing  4/2/2024 1734 by Opal King RN  Outcome: Progressing     Problem: Safety - Adult  Goal: Free from fall injury  4/2/2024 1838 by Mallory Guillaume RN  Outcome: Progressing  4/2/2024 1734 by Opal King

## 2024-04-02 NOTE — PLAN OF CARE
Problem: Chronic Conditions and Co-morbidities  Goal: Patient's chronic conditions and co-morbidity symptoms are monitored and maintained or improved  Outcome: Progressing     Problem: Discharge Planning  Goal: Discharge to home or other facility with appropriate resources  Outcome: Progressing     Problem: Confusion  Goal: Confusion, delirium, dementia, or psychosis is improved or at baseline  Description: INTERVENTIONS:  1. Assess for possible contributors to thought disturbance, including medications, impaired vision or hearing, underlying metabolic abnormalities, dehydration, psychiatric diagnoses, and notify attending LIP  2. [place] high risk fall precautions, as indicated  3. Provide frequent short contacts to provide reality reorientation, refocusing and direction  4. Decrease environmental stimuli, including noise as appropriate  5. Monitor and intervene to maintain adequate nutrition, hydration, elimination, sleep and activity  6. If unable to ensure safety without constant attention obtain sitter and review sitter guidelines with assigned personnel  7. Initiate Psychosocial CNS and Spiritual Care consult, as indicated  Outcome: Progressing     Problem: Risk for Elopement  Goal: Patient will not exit the unit/facility without proper excort  Outcome: Progressing     Problem: Safety - Adult  Goal: Free from fall injury  Outcome: Progressing

## 2024-04-02 NOTE — PLAN OF CARE
Problem: Confusion  Goal: Confusion, delirium, dementia, or psychosis is improved or at baseline  Description: INTERVENTIONS:  1. Assess for possible contributors to thought disturbance, including medications, impaired vision or hearing, underlying metabolic abnormalities, dehydration, psychiatric diagnoses, and notify attending LIP  2. [place] high risk fall precautions, as indicated  3. Provide frequent short contacts to provide reality reorientation, refocusing and direction  4. Decrease environmental stimuli, including noise as appropriate  5. Monitor and intervene to maintain adequate nutrition, hydration, elimination, sleep and activity  6. If unable to ensure safety without constant attention obtain sitter and review sitter guidelines with assigned personnel  7. Initiate Psychosocial CNS and Spiritual Care consult, as indicated  4/2/2024 1815 by Opal King, RN  Outcome: Progressing  4/2/2024 1734 by Opal King, RN  Outcome: Progressing

## 2024-04-03 ENCOUNTER — APPOINTMENT (OUTPATIENT)
Dept: NON INVASIVE DIAGNOSTICS | Age: 79
DRG: 065 | End: 2024-04-03
Attending: INTERNAL MEDICINE
Payer: MEDICARE

## 2024-04-03 PROBLEM — R29.898 LEFT ARM WEAKNESS: Status: ACTIVE | Noted: 2024-04-03

## 2024-04-03 PROBLEM — I63.9 CEREBROVASCULAR ACCIDENT (CVA) (HCC): Status: ACTIVE | Noted: 2024-04-03

## 2024-04-03 LAB
ALBUMIN SERPL-MCNC: 3.7 GM/DL (ref 3.4–5)
ALP BLD-CCNC: 84 IU/L (ref 40–129)
ALT SERPL-CCNC: 20 U/L (ref 10–40)
ANION GAP SERPL CALCULATED.3IONS-SCNC: 12 MMOL/L (ref 7–16)
ASPIRIN: 443 ARU
AST SERPL-CCNC: 21 IU/L (ref 15–37)
BASOPHILS ABSOLUTE: 0 K/CU MM
BASOPHILS RELATIVE PERCENT: 0.4 % (ref 0–1)
BILIRUB SERPL-MCNC: 0.8 MG/DL (ref 0–1)
BILIRUBIN DIRECT: 0.2 MG/DL (ref 0–0.3)
BILIRUBIN, INDIRECT: 0.6 MG/DL (ref 0–0.7)
BUN SERPL-MCNC: 15 MG/DL (ref 6–23)
CALCIUM SERPL-MCNC: 9.1 MG/DL (ref 8.3–10.6)
CHLORIDE BLD-SCNC: 102 MMOL/L (ref 99–110)
CHOLEST SERPL-MCNC: 86 MG/DL
CO2: 23 MMOL/L (ref 21–32)
CREAT SERPL-MCNC: 1.1 MG/DL (ref 0.9–1.3)
DIFFERENTIAL TYPE: ABNORMAL
EKG ATRIAL RATE: 79 BPM
EKG DIAGNOSIS: NORMAL
EKG P AXIS: 58 DEGREES
EKG P-R INTERVAL: 158 MS
EKG Q-T INTERVAL: 390 MS
EKG QRS DURATION: 84 MS
EKG QTC CALCULATION (BAZETT): 447 MS
EKG R AXIS: 0 DEGREES
EKG T AXIS: 7 DEGREES
EKG VENTRICULAR RATE: 79 BPM
EOSINOPHILS ABSOLUTE: 0.3 K/CU MM
EOSINOPHILS RELATIVE PERCENT: 4.4 % (ref 0–3)
ESTIMATED AVERAGE GLUCOSE: 111 MG/DL
GFR SERPL CREATININE-BSD FRML MDRD: 69 ML/MIN/1.73M2
GLUCOSE SERPL-MCNC: 95 MG/DL (ref 70–99)
HBA1C MFR BLD: 5.5 % (ref 4.2–6.3)
HCT VFR BLD CALC: 40.3 % (ref 42–52)
HDLC SERPL-MCNC: 32 MG/DL
HEMOGLOBIN: 13.1 GM/DL (ref 13.5–18)
IMMATURE NEUTROPHIL %: 0.3 % (ref 0–0.43)
LDLC SERPL CALC-MCNC: 40 MG/DL
LYMPHOCYTES ABSOLUTE: 3.2 K/CU MM
LYMPHOCYTES RELATIVE PERCENT: 42.1 % (ref 24–44)
MAGNESIUM: 2 MG/DL (ref 1.8–2.4)
MCH RBC QN AUTO: 30.2 PG (ref 27–31)
MCHC RBC AUTO-ENTMCNC: 32.5 % (ref 32–36)
MCV RBC AUTO: 92.9 FL (ref 78–100)
MONOCYTES ABSOLUTE: 0.9 K/CU MM
MONOCYTES RELATIVE PERCENT: 11.9 % (ref 0–4)
NUCLEATED RBC %: 0 %
PDW BLD-RTO: 13.7 % (ref 11.7–14.9)
PHOSPHORUS: 3.6 MG/DL (ref 2.5–4.9)
PLATELET # BLD: 260 K/CU MM (ref 140–440)
PMV BLD AUTO: 10.2 FL (ref 7.5–11.1)
POTASSIUM SERPL-SCNC: 3.7 MMOL/L (ref 3.5–5.1)
RBC # BLD: 4.34 M/CU MM (ref 4.6–6.2)
SEGMENTED NEUTROPHILS ABSOLUTE COUNT: 3.1 K/CU MM
SEGMENTED NEUTROPHILS RELATIVE PERCENT: 40.9 % (ref 36–66)
SODIUM BLD-SCNC: 137 MMOL/L (ref 135–145)
TOTAL IMMATURE NEUTOROPHIL: 0.02 K/CU MM
TOTAL NUCLEATED RBC: 0 K/CU MM
TOTAL PROTEIN: 6.1 GM/DL (ref 6.4–8.2)
TRIGL SERPL-MCNC: 72 MG/DL
WBC # BLD: 7.5 K/CU MM (ref 4–10.5)

## 2024-04-03 PROCEDURE — 93010 ELECTROCARDIOGRAM REPORT: CPT | Performed by: INTERNAL MEDICINE

## 2024-04-03 PROCEDURE — 97166 OT EVAL MOD COMPLEX 45 MIN: CPT

## 2024-04-03 PROCEDURE — 97162 PT EVAL MOD COMPLEX 30 MIN: CPT

## 2024-04-03 PROCEDURE — 97530 THERAPEUTIC ACTIVITIES: CPT

## 2024-04-03 PROCEDURE — 85576 BLOOD PLATELET AGGREGATION: CPT

## 2024-04-03 PROCEDURE — 6360000002 HC RX W HCPCS: Performed by: INTERNAL MEDICINE

## 2024-04-03 PROCEDURE — 1200000000 HC SEMI PRIVATE

## 2024-04-03 PROCEDURE — 99223 1ST HOSP IP/OBS HIGH 75: CPT | Performed by: STUDENT IN AN ORGANIZED HEALTH CARE EDUCATION/TRAINING PROGRAM

## 2024-04-03 PROCEDURE — 36415 COLL VENOUS BLD VENIPUNCTURE: CPT

## 2024-04-03 PROCEDURE — 80053 COMPREHEN METABOLIC PANEL: CPT

## 2024-04-03 PROCEDURE — 92610 EVALUATE SWALLOWING FUNCTION: CPT

## 2024-04-03 PROCEDURE — 97116 GAIT TRAINING THERAPY: CPT

## 2024-04-03 PROCEDURE — 93306 TTE W/DOPPLER COMPLETE: CPT

## 2024-04-03 PROCEDURE — 83036 HEMOGLOBIN GLYCOSYLATED A1C: CPT

## 2024-04-03 PROCEDURE — 99223 1ST HOSP IP/OBS HIGH 75: CPT | Performed by: INTERNAL MEDICINE

## 2024-04-03 PROCEDURE — 2580000003 HC RX 258: Performed by: INTERNAL MEDICINE

## 2024-04-03 PROCEDURE — 83735 ASSAY OF MAGNESIUM: CPT

## 2024-04-03 PROCEDURE — 82248 BILIRUBIN DIRECT: CPT

## 2024-04-03 PROCEDURE — 84100 ASSAY OF PHOSPHORUS: CPT

## 2024-04-03 PROCEDURE — 80061 LIPID PANEL: CPT

## 2024-04-03 PROCEDURE — 6370000000 HC RX 637 (ALT 250 FOR IP): Performed by: INTERNAL MEDICINE

## 2024-04-03 PROCEDURE — 85025 COMPLETE CBC W/AUTO DIFF WBC: CPT

## 2024-04-03 RX ADMIN — Medication 1 TABLET: at 10:06

## 2024-04-03 RX ADMIN — Medication 3 MG: at 20:57

## 2024-04-03 RX ADMIN — ENOXAPARIN SODIUM 40 MG: 100 INJECTION SUBCUTANEOUS at 10:06

## 2024-04-03 RX ADMIN — SODIUM CHLORIDE, PRESERVATIVE FREE 10 ML: 5 INJECTION INTRAVENOUS at 10:08

## 2024-04-03 RX ADMIN — MEMANTINE 10 MG: 10 TABLET ORAL at 20:57

## 2024-04-03 RX ADMIN — ATORVASTATIN CALCIUM 40 MG: 40 TABLET, FILM COATED ORAL at 20:57

## 2024-04-03 RX ADMIN — ASPIRIN 81 MG: 81 TABLET, CHEWABLE ORAL at 10:07

## 2024-04-03 RX ADMIN — SODIUM CHLORIDE, PRESERVATIVE FREE 10 ML: 5 INJECTION INTRAVENOUS at 20:56

## 2024-04-03 RX ADMIN — DIVALPROEX SODIUM 125 MG: 125 CAPSULE, COATED PELLETS ORAL at 10:07

## 2024-04-03 RX ADMIN — DIVALPROEX SODIUM 125 MG: 125 CAPSULE, COATED PELLETS ORAL at 20:58

## 2024-04-03 RX ADMIN — MEMANTINE 10 MG: 10 TABLET ORAL at 10:06

## 2024-04-03 ASSESSMENT — PAIN SCALES - GENERAL
PAINLEVEL_OUTOF10: 0
PAINLEVEL_OUTOF10: 0

## 2024-04-03 NOTE — PLAN OF CARE
Problem: Chronic Conditions and Co-morbidities  Goal: Patient's chronic conditions and co-morbidity symptoms are monitored and maintained or improved  4/3/2024 1029 by Grover White RN  Outcome: Progressing  4/2/2024 2315 by Tom Morton RN  Outcome: Progressing     Problem: Discharge Planning  Goal: Discharge to home or other facility with appropriate resources  4/3/2024 1029 by Grover White RN  Outcome: Progressing  4/2/2024 2315 by Tom Morton RN  Outcome: Progressing     Problem: Confusion  Goal: Confusion, delirium, dementia, or psychosis is improved or at baseline  Description: INTERVENTIONS:  1. Assess for possible contributors to thought disturbance, including medications, impaired vision or hearing, underlying metabolic abnormalities, dehydration, psychiatric diagnoses, and notify attending LIP  2. [place] high risk fall precautions, as indicated  3. Provide frequent short contacts to provide reality reorientation, refocusing and direction  4. Decrease environmental stimuli, including noise as appropriate  5. Monitor and intervene to maintain adequate nutrition, hydration, elimination, sleep and activity  6. If unable to ensure safety without constant attention obtain sitter and review sitter guidelines with assigned personnel  7. Initiate Psychosocial CNS and Spiritual Care consult, as indicated  4/3/2024 1029 by Grover White RN  Outcome: Progressing  4/2/2024 2315 by Tom Morton RN  Outcome: Progressing     Problem: Risk for Elopement  Goal: Patient will not exit the unit/facility without proper excort  4/3/2024 1029 by Grover White RN  Outcome: Progressing  4/2/2024 2315 by Tom Morton RN  Outcome: Progressing     Problem: Safety - Adult  Goal: Free from fall injury  4/3/2024 1029 by Grover White RN  Outcome: Progressing  4/2/2024 2315 by Tom Morton RN  Outcome: Progressing

## 2024-04-03 NOTE — PLAN OF CARE
Problem: Chronic Conditions and Co-morbidities  Goal: Patient's chronic conditions and co-morbidity symptoms are monitored and maintained or improved  4/3/2024 1030 by Grover White RN  Outcome: Progressing  4/3/2024 1029 by Grover White RN  Outcome: Progressing  4/2/2024 2315 by Tom Morton RN  Outcome: Progressing     Problem: Discharge Planning  Goal: Discharge to home or other facility with appropriate resources  4/3/2024 1030 by Grover White RN  Outcome: Progressing  4/3/2024 1029 by Grover White RN  Outcome: Progressing  4/2/2024 2315 by Tom Morton RN  Outcome: Progressing     Problem: Confusion  Goal: Confusion, delirium, dementia, or psychosis is improved or at baseline  Description: INTERVENTIONS:  1. Assess for possible contributors to thought disturbance, including medications, impaired vision or hearing, underlying metabolic abnormalities, dehydration, psychiatric diagnoses, and notify attending LIP  2. [place] high risk fall precautions, as indicated  3. Provide frequent short contacts to provide reality reorientation, refocusing and direction  4. Decrease environmental stimuli, including noise as appropriate  5. Monitor and intervene to maintain adequate nutrition, hydration, elimination, sleep and activity  6. If unable to ensure safety without constant attention obtain sitter and review sitter guidelines with assigned personnel  7. Initiate Psychosocial CNS and Spiritual Care consult, as indicated  4/3/2024 1030 by Grover White RN  Outcome: Progressing  4/3/2024 1029 by Grover White RN  Outcome: Progressing  4/2/2024 2315 by Tom Morton RN  Outcome: Progressing     Problem: Risk for Elopement  Goal: Patient will not exit the unit/facility without proper excort  4/3/2024 1030 by Grover White RN  Outcome: Progressing  4/3/2024 1029 by Grover White RN  Outcome: Progressing  4/2/2024 2315 by Tom Morton RN  Outcome: Progressing     Problem: Safety -

## 2024-04-03 NOTE — H&P
Electronically signed by Hilary Forrester DO    CT HEAD WO CONTRAST    Result Date: 4/2/2024  STUDY DATE:  4/2/2024 7:26 EDT EXAM: CT HEAD WO CONTRAST INDICATION: Stroke Symptoms; upper extremity weakness. COMPARISON: 3/3/2024. TECHNICAL: Axial CT imaging of the head. Axial images and multiplanar reformatted images reviewed.  Individualized dose optimization technique was used in order to meet ALARA standards for radiation dose reduction.  In addition to vendor specific dose reduction algorithms, the dose reduction techniques vary based on the specific scanner utilized but frequently include automated exposure control, adjustment of the mA and/or kV according to patient size, and use of iterative reconstruction technique. CONTRAST: None. FINDINGS: Redemonstration of pronounced patchy and confluent hypoattenuation within the periventricular white matter, without interval change. Encephalomalacia right parietal lobe. Remote basal ganglia infarctions. No acute intracranial hemorrhage. No mass or mass effect. No midline shift. Gray-white matter differentiation is maintained. Prominent ventricles, sulci and cisterns, stable. Patchy opacification ethmoid sinuses. Mild mucosal thickening of the frontal and maxillary sinuses. Mastoid air cells are well aerated.     No CT evidence of an acute intracranial hemorrhage or acute large vessel territory infarction. Stable appearance of the brain with redemonstration of patchy confluent periventricular and subcortical white matter hypodensities. This is nonspecific however most likely reflects sequela from chronic microvascular ischemic disease. Given the extensive periventricular and subcortical white matter hypoattenuation, this can mask developing infarction. The need for short-term CT follow-up versus MRI should be determined on a clinical basis. Moderate cortical atrophy. Remote right parietal lobe cortical infarct. Remote lacunar infarctions of the basal ganglia. Case was

## 2024-04-03 NOTE — CONSULTS
[facility] Nurse  Consult Note       Steve Humphries  AGE: 79 y.o.   GENDER: male  : 1945  TODAY'S DATE:  4/3/2024    Subjective:     Reason for CWOCN Evaluation and Assessment: wound assessment      Steve Humphries is a 79 y.o. male referred by:   [x] Physician  [] Nursing  [] Other:     Wound Identification:  Wound Type: traumatic  Contributing Factors: decreased mobility and incontinence of stool        PAST MEDICAL HISTORY        Diagnosis Date    Acute CVA (cerebrovascular accident) (HCC) 10/07/2022    Dementia (HCC)     UTI (urinary tract infection)        PAST SURGICAL HISTORY    Past Surgical History:   Procedure Laterality Date    APPENDECTOMY      HERNIA REPAIR      TURP N/A 2022    CYSTOSCOPY TRANSURETHRAL RESECTION PROSTATE performed by Neo Car MD at [facility] OR       FAMILY HISTORY    Family History   Problem Relation Age of Onset    No Known Problems Mother     No Known Problems Father     Cancer Maternal Grandfather        SOCIAL HISTORY    Social History     Tobacco Use    Smoking status: Never    Smokeless tobacco: Never   Vaping Use    Vaping Use: Never used   Substance Use Topics    Alcohol use: Not Currently    Drug use: Not Currently       ALLERGIES    Allergies   Allergen Reactions    Tylenol [Acetaminophen]        MEDICATIONS    No current facility-administered medications on file prior to encounter.     Current Outpatient Medications on File Prior to Encounter   Medication Sig Dispense Refill    divalproex (DEPAKOTE) 125 MG DR tablet Take 1 tablet by mouth 2 times daily      hydrOXYzine HCl (ATARAX) 25 MG tablet Take 1 tablet by mouth every 6 hours as needed for Itching      melatonin 3 MG TABS tablet Take 1 tablet by mouth at bedtime      amLODIPine (NORVASC) 5 MG tablet Take 1 tablet by mouth daily (Patient taking differently: Take 2 tablets by mouth daily) 30 tablet 3    aspirin 81 MG chewable tablet Take 1 tablet by mouth daily 30 tablet 3    atorvastatin 
Vascular/Interventional Neurology Service Consult Note  [facility]   Patient Name: Steve Humphries  : 1945        Subjective:   Reason for consult:   Steve Humphries 79 -year-old male PMH HTN, BPH s/p TURP, CKD, Alzheimer's dementia, prior stroke, carotid stenosis presenting to [facility] from [facility] nursing home.  Nursing staff reports that he was not acting normal with left arm weakness.  They reported he was acting funny all night.  A stroke alert was called.  LKW unknown.  He was evaluated by [facility] teleneurology.  NIH 13.  Not a candidate for TNK due to presenting outside the window.  CT head no acute intracranial hemorrhage.  Stable appearance of the brain with redemonstration of patchy confluent periventricular and subcortical white matter hypodensities. This is nonspecific however most likely reflects sequela from chronic microvascular ischemic disease. Given the extensive periventricular and subcortical white matter hypoattenuation, this can mask developing infarction. Remote right parietal lobe cortical infarct. Remote lacunar infarctions of the basal ganglia.  CTA notes unchanged bilateral proximal ICA stenosis 70% on the right and 50% on the left.  Chronic occlusion right vertebral artery origin.  Unchanged severe stenosis of the left vertebral artery origin.     He was recently admitted 3/8 with pneumonia and bilateral lower extremity weakness.  CT head and CTA was negative for acute changes.  Recent MRI in January noted subacute infarct in right basal ganglia and right occipital lobe.  He was evaluated by our service at that time and not a candidate for intervention at that time.  Recommended medical management.    The patient has a history of left occipital lobe hemorrhage stroke in 2022 at [facility].  He followed up with neurology at [facility] who suspected amyloid angiopathy etiology.  Then a right temporal lobe ischemic stroke in 2023.  He follows with 
transfers with FWW or LRAD and SUP  Short Term Goal 3: Patient will ambulate 200' with FWW or LRAD and SUP       Treatment plan:  Bed mobility, transfers, balance, gait, TA, TX, neuromuscular re-education    Recommendations for NURSING mobility: ambulate to<>from bathroom with FWW and CGA    Time:   Time in: 1028  Time out: 1049  Timed treatment minutes: 9  Total time: 21    Electronically signed by:    Charla Medina, PT  4/3/2024, 3:21 PM    
amplitude are normal no bruit, pedal pulses are normal  GI:  Bowel sounds normal, Soft, No tenderness, No masses, No pulsatile masses, no hepatosplenomegally, no bruits  : External genitalia appear normal, No masses or lesions. No discharge. No CVA tenderness.   Musculoskeletal:  Intact distal pulses, No edema, No tenderness, No cyanosis, No clubbing. Good range of motion in all major joints. No tenderness to palpation or major deformities noted. Back- No tenderness.   Integument:  Warm, Dry, No erythema, No rash.   Skin: no rash, no ulcers  Lymphatic:  No lymphadenopathy noted.   Neurologic:  Alert & oriented x 1 Normal motor function, Normal sensory function, No focal deficits noted.     Lab Review   Recent Labs     04/03/24 0112   WBC 7.5   HGB 13.1*   HCT 40.3*         Recent Labs     04/03/24 0112      K 3.7      CO2 23   PHOS 3.6   BUN 15   CREATININE 1.1     Recent Labs     04/03/24 0112   AST 21   ALT 20   BILIDIR 0.2   BILITOT 0.8   ALKPHOS 84     No results for input(s): \"TROPONINI\" in the last 72 hours.  No results found for: \"BNP\"  Lab Results   Component Value Date    INR 1.1 04/02/2024    PROTIME 14.4 04/02/2024         EKG:NSR    Chest Xray:    ECHO:PENDING  Labs, echo, meds reviewed  Assessment: 79 y.o.year old with PMH of  has a past medical history of Acute CVA (cerebrovascular accident) (HCC), Dementia (HCC), and UTI (urinary tract infection).      Recommendations:    Stroke with right frontoparietal lobe involvement, requested to get JUAN ALBERTO, will proceed with JUAN ALBERTO tomorrow with anesthesia available  Acute stroke as per neurology continue aspirin and statins blood pressure control  Dementia continue Namenda  Health maintenance: exerise and diet  All labs, medications and tests reviewed, continue all other medications of all above medical condition listed as is.         Zamzam Patel MD, 4/3/2024 6:33 PM       
care as documented.  I have evaluated/treated an acute on chronic illness/injury that poses threat to life or bodily function and/or Chronic illness with severe exacerbation, or treatment of side effect in this encounter. I have performed a substantive portion of this shared visit, with more than 50% of the time spent in face-to-face and in direct patient care, including obtaining critical elements of the history, performing a physical exam, reviewing laboratory and radiological data, medical decision-making, coordinating patient care, discussing the plan of care with the patient, and documentation.  The above was discussed and reviewed with the SHAVON.     Patient with right MCA territory infarct likely secondary to thromboembolic etiology from right carotid stenosis.  He is known to have amyloid angiopathy.  Neurointervention has seen the patient inpatient and on an outpatient basis.  Family does politely declined any intervention as they do worry about patient's candidacy which does appear neurointervention agrees with.  He will remain on aspirin and statin therapy.  Verify now aspirin was completed and patient is a responder to aspirin.  Nothing further from a neurologic standpoint.    Communicated management of care with internal medicine team via [employer].    Joi Urbano DO 4/3/2024 4:36 PM

## 2024-04-03 NOTE — DISCHARGE INSTR - COC
Continuity of Care Form    Patient Name: Steve Humphries   :  1945  MRN:  [id number]    Admit date:  2024  Discharge date:  2024    Code Status Order: Limited   Advance Directives:     Admitting Physician:  Chetan Zaidi MD  PCP: Janes Rai MD    Discharging Nurse: Opal MARLOW  Discharging Hospital Unit/Room#: 3005/3005-A  Discharging Unit Phone Number: [phone]    Emergency Contact:   Extended Emergency Contact Information  Primary Emergency Contact: Monalisa Humphries  Home Phone: [phone]  Mobile Phone: [phone]  Relation: Spouse  Preferred language: English   needed? No  Secondary Emergency Contact: JUAN MIGUEL HUMPHRIES  Home Phone: [phone]  Relation: Child    Past Surgical History:  Past Surgical History:   Procedure Laterality Date    APPENDECTOMY      HERNIA REPAIR      TURP N/A 2022    CYSTOSCOPY TRANSURETHRAL RESECTION PROSTATE performed by Neo Car MD at [facility] OR       Immunization History:   Immunization History   Administered Date(s) Administered    COVID-19, PFIZER PURPLE top, DILUTE for use, (age 12 y+), 30mcg/0.3mL 2021, 2021       Active Problems:  Patient Active Problem List   Diagnosis Code    Enlarged prostate with lower urinary tract symptoms (LUTS) N40.1    Benign prostatic hyperplasia with urinary frequency N40.1, R35.0    Vertigo R42    Stage 3b chronic kidney disease (HCC) N18.32    Hypercalcemia E83.52    Chronic kidney disease, stage 3a (HCC) N18.31    Acute CVA (cerebrovascular accident) ([facility]) I63.9    Ataxia R27.0    Right homonymous hemianopsia H53.461    Aphasia due to acute stroke ([facility]) I63.9, R47.01    Alzheimer's disease, unspecified G30.9    CAP (community acquired pneumonia) due to Chlamydia species J16.0    Weakness of both lower extremities R29.898    Stroke due to embolism of right carotid artery ([facility]) I63.131       Isolation/Infection:   Isolation            No Isolation          Patient Infection Status       None to display

## 2024-04-03 NOTE — CARE COORDINATION
CM reviewed notes. Per notes pt is from [facility]. CM called Monse at [facility] and left a VM.   1130 CM into see pt and his wife is present. Pt is confused and has a sitter at bedside. Cm discussed with wife discharge planning. . Wife would like pt to return to [facility] when discharged.   CM spoke with Monse and pt may return when med cleared. No precert needed. CM placed a PS too Dr Cole and updated.

## 2024-04-03 NOTE — PLAN OF CARE
Problem: Chronic Conditions and Co-morbidities  Goal: Patient's chronic conditions and co-morbidity symptoms are monitored and maintained or improved  4/2/2024 2315 by Tom Morton RN  Outcome: Progressing  4/2/2024 1838 by Mallory Guillaume RN  Outcome: Progressing  4/2/2024 1734 by Opal King RN  Outcome: Progressing     Problem: Discharge Planning  Goal: Discharge to home or other facility with appropriate resources  4/2/2024 2315 by Tom Morton RN  Outcome: Progressing  4/2/2024 1838 by Mallory Guillaume RN  Outcome: Progressing  4/2/2024 1734 by Opal iKng RN  Outcome: Progressing     Problem: Confusion  Goal: Confusion, delirium, dementia, or psychosis is improved or at baseline  Description: INTERVENTIONS:  1. Assess for possible contributors to thought disturbance, including medications, impaired vision or hearing, underlying metabolic abnormalities, dehydration, psychiatric diagnoses, and notify attending LIP  2. [place] high risk fall precautions, as indicated  3. Provide frequent short contacts to provide reality reorientation, refocusing and direction  4. Decrease environmental stimuli, including noise as appropriate  5. Monitor and intervene to maintain adequate nutrition, hydration, elimination, sleep and activity  6. If unable to ensure safety without constant attention obtain sitter and review sitter guidelines with assigned personnel  7. Initiate Psychosocial CNS and Spiritual Care consult, as indicated  4/2/2024 2315 by Tom Morton RN  Outcome: Progressing  4/2/2024 1838 by Mallory Guillaume RN  Outcome: Not Progressing  4/2/2024 1815 by Opal King RN  Outcome: Progressing  4/2/2024 1734 by Opal King RN  Outcome: Progressing     Problem: Risk for Elopement  Goal: Patient will not exit the unit/facility without proper excort  4/2/2024 2315 by Tom Morton RN  Outcome: Progressing  4/2/2024 1838 by Mallory Guillaume RN  Outcome: Not

## 2024-04-04 ENCOUNTER — APPOINTMENT (OUTPATIENT)
Dept: NON INVASIVE DIAGNOSTICS | Age: 79
DRG: 065 | End: 2024-04-04
Attending: INTERNAL MEDICINE
Payer: MEDICARE

## 2024-04-04 VITALS
WEIGHT: 190 LBS | DIASTOLIC BLOOD PRESSURE: 69 MMHG | HEART RATE: 109 BPM | HEIGHT: 73 IN | RESPIRATION RATE: 24 BRPM | SYSTOLIC BLOOD PRESSURE: 132 MMHG | BODY MASS INDEX: 25.18 KG/M2 | OXYGEN SATURATION: 95 % | TEMPERATURE: 98.2 F

## 2024-04-04 LAB
ALBUMIN SERPL-MCNC: 3.8 GM/DL (ref 3.4–5)
ALP BLD-CCNC: 82 IU/L (ref 40–129)
ALT SERPL-CCNC: 18 U/L (ref 10–40)
ANION GAP SERPL CALCULATED.3IONS-SCNC: 13 MMOL/L (ref 7–16)
AST SERPL-CCNC: 22 IU/L (ref 15–37)
BASOPHILS ABSOLUTE: 0 K/CU MM
BASOPHILS RELATIVE PERCENT: 0.5 % (ref 0–1)
BILIRUB SERPL-MCNC: 0.6 MG/DL (ref 0–1)
BILIRUBIN DIRECT: 0.2 MG/DL (ref 0–0.3)
BILIRUBIN, INDIRECT: 0.4 MG/DL (ref 0–0.7)
BUN SERPL-MCNC: 17 MG/DL (ref 6–23)
CALCIUM SERPL-MCNC: 9 MG/DL (ref 8.3–10.6)
CHLORIDE BLD-SCNC: 104 MMOL/L (ref 99–110)
CO2: 21 MMOL/L (ref 21–32)
CREAT SERPL-MCNC: 1.2 MG/DL (ref 0.9–1.3)
DIFFERENTIAL TYPE: ABNORMAL
ECHO AO ROOT DIAM: 3.5 CM
ECHO AO ROOT INDEX: 1.66 CM/M2
ECHO AR MAX VEL PISA: 4.2 M/S
ECHO AV AREA PEAK VELOCITY: 2.1 CM2
ECHO AV AREA VTI: 2.2 CM2
ECHO AV AREA/BSA PEAK VELOCITY: 1 CM2/M2
ECHO AV AREA/BSA VTI: 1 CM2/M2
ECHO AV MEAN GRADIENT: 8 MMHG
ECHO AV MEAN VELOCITY: 1.3 M/S
ECHO AV PEAK GRADIENT: 14 MMHG
ECHO AV PEAK VELOCITY: 1.9 M/S
ECHO AV REGURGITANT PHT: 324 MS
ECHO AV VELOCITY RATIO: 0.68
ECHO AV VTI: 33 CM
ECHO BSA: 2.11 M2
ECHO EST RA PRESSURE: 3 MMHG
ECHO LA AREA 4C: 14.3 CM2
ECHO LA DIAMETER INDEX: 1.37 CM/M2
ECHO LA DIAMETER: 2.9 CM
ECHO LA MAJOR AXIS: 5 CM
ECHO LA TO AORTIC ROOT RATIO: 0.83
ECHO LA VOL MOD A4C: 34 ML (ref 18–58)
ECHO LA VOLUME INDEX MOD A4C: 16 ML/M2 (ref 16–34)
ECHO LV E' LATERAL VELOCITY: 13 CM/S
ECHO LV E' SEPTAL VELOCITY: 9 CM/S
ECHO LV EDV A4C: 60 ML
ECHO LV EDV INDEX A4C: 28 ML/M2
ECHO LV EJECTION FRACTION A4C: 56 %
ECHO LV ESV A4C: 27 ML
ECHO LV ESV INDEX A4C: 13 ML/M2
ECHO LV FRACTIONAL SHORTENING: 32 % (ref 28–44)
ECHO LV INTERNAL DIMENSION DIASTOLE INDEX: 1.8 CM/M2
ECHO LV INTERNAL DIMENSION DIASTOLIC: 3.8 CM (ref 4.2–5.9)
ECHO LV INTERNAL DIMENSION SYSTOLIC INDEX: 1.23 CM/M2
ECHO LV INTERNAL DIMENSION SYSTOLIC: 2.6 CM
ECHO LV IVSD: 1.2 CM (ref 0.6–1)
ECHO LV MASS 2D: 143.8 G (ref 88–224)
ECHO LV MASS INDEX 2D: 68.1 G/M2 (ref 49–115)
ECHO LV POSTERIOR WALL DIASTOLIC: 1.1 CM (ref 0.6–1)
ECHO LV RELATIVE WALL THICKNESS RATIO: 0.58
ECHO LVOT AREA: 3.1 CM2
ECHO LVOT AV VTI INDEX: 0.71
ECHO LVOT DIAM: 2 CM
ECHO LVOT MEAN GRADIENT: 4 MMHG
ECHO LVOT PEAK GRADIENT: 6 MMHG
ECHO LVOT PEAK VELOCITY: 1.3 M/S
ECHO LVOT STROKE VOLUME INDEX: 34.7 ML/M2
ECHO LVOT SV: 73.2 ML
ECHO LVOT VTI: 23.3 CM
ECHO MV A VELOCITY: 0.76 M/S
ECHO MV E DECELERATION TIME (DT): 180 MS
ECHO MV E VELOCITY: 0.56 M/S
ECHO MV E/A RATIO: 0.74
ECHO MV E/E' LATERAL: 4.31
ECHO MV E/E' RATIO (AVERAGED): 5.26
ECHO RIGHT VENTRICULAR SYSTOLIC PRESSURE (RVSP): 24 MMHG
ECHO RV MID DIMENSION: 2.5 CM
ECHO TV REGURGITANT MAX VELOCITY: 2.3 M/S
ECHO TV REGURGITANT PEAK GRADIENT: 21 MMHG
EOSINOPHILS ABSOLUTE: 0.4 K/CU MM
EOSINOPHILS RELATIVE PERCENT: 4.1 % (ref 0–3)
GFR SERPL CREATININE-BSD FRML MDRD: 62 ML/MIN/1.73M2
GLUCOSE SERPL-MCNC: 90 MG/DL (ref 70–99)
HCT VFR BLD CALC: 40.6 % (ref 42–52)
HEMOGLOBIN: 13.1 GM/DL (ref 13.5–18)
IMMATURE NEUTROPHIL %: 0.1 % (ref 0–0.43)
LYMPHOCYTES ABSOLUTE: 4.6 K/CU MM
LYMPHOCYTES RELATIVE PERCENT: 52.1 % (ref 24–44)
MAGNESIUM: 2 MG/DL (ref 1.8–2.4)
MCH RBC QN AUTO: 30.6 PG (ref 27–31)
MCHC RBC AUTO-ENTMCNC: 32.3 % (ref 32–36)
MCV RBC AUTO: 94.9 FL (ref 78–100)
MONOCYTES ABSOLUTE: 1 K/CU MM
MONOCYTES RELATIVE PERCENT: 11.4 % (ref 0–4)
NUCLEATED RBC %: 0 %
PDW BLD-RTO: 13.8 % (ref 11.7–14.9)
PHOSPHORUS: 3.4 MG/DL (ref 2.5–4.9)
PLATELET # BLD: 248 K/CU MM (ref 140–440)
PMV BLD AUTO: 10.3 FL (ref 7.5–11.1)
POTASSIUM SERPL-SCNC: 3.7 MMOL/L (ref 3.5–5.1)
RBC # BLD: 4.28 M/CU MM (ref 4.6–6.2)
SEGMENTED NEUTROPHILS ABSOLUTE COUNT: 2.8 K/CU MM
SEGMENTED NEUTROPHILS RELATIVE PERCENT: 31.8 % (ref 36–66)
SODIUM BLD-SCNC: 138 MMOL/L (ref 135–145)
TOTAL IMMATURE NEUTOROPHIL: 0.01 K/CU MM
TOTAL NUCLEATED RBC: 0 K/CU MM
TOTAL PROTEIN: 6 GM/DL (ref 6.4–8.2)
WBC # BLD: 8.8 K/CU MM (ref 4–10.5)

## 2024-04-04 PROCEDURE — 80053 COMPREHEN METABOLIC PANEL: CPT

## 2024-04-04 PROCEDURE — 94761 N-INVAS EAR/PLS OXIMETRY MLT: CPT

## 2024-04-04 PROCEDURE — 36415 COLL VENOUS BLD VENIPUNCTURE: CPT

## 2024-04-04 PROCEDURE — 6370000000 HC RX 637 (ALT 250 FOR IP): Performed by: INTERNAL MEDICINE

## 2024-04-04 PROCEDURE — 6360000002 HC RX W HCPCS: Performed by: INTERNAL MEDICINE

## 2024-04-04 PROCEDURE — 83735 ASSAY OF MAGNESIUM: CPT

## 2024-04-04 PROCEDURE — 2580000003 HC RX 258: Performed by: INTERNAL MEDICINE

## 2024-04-04 PROCEDURE — 85025 COMPLETE CBC W/AUTO DIFF WBC: CPT

## 2024-04-04 PROCEDURE — 84100 ASSAY OF PHOSPHORUS: CPT

## 2024-04-04 PROCEDURE — APPNB45 APP NON BILLABLE 31-45 MINUTES: Performed by: NURSE PRACTITIONER

## 2024-04-04 PROCEDURE — 82248 BILIRUBIN DIRECT: CPT

## 2024-04-04 RX ADMIN — Medication 1 TABLET: at 12:01

## 2024-04-04 RX ADMIN — DIVALPROEX SODIUM 125 MG: 125 CAPSULE, COATED PELLETS ORAL at 12:01

## 2024-04-04 RX ADMIN — ASPIRIN 81 MG: 81 TABLET, CHEWABLE ORAL at 12:01

## 2024-04-04 RX ADMIN — MEMANTINE 10 MG: 10 TABLET ORAL at 12:01

## 2024-04-04 RX ADMIN — ENOXAPARIN SODIUM 40 MG: 100 INJECTION SUBCUTANEOUS at 12:01

## 2024-04-04 RX ADMIN — SODIUM CHLORIDE, PRESERVATIVE FREE 10 ML: 5 INJECTION INTRAVENOUS at 12:00

## 2024-04-04 ASSESSMENT — PAIN SCALES - GENERAL
PAINLEVEL_OUTOF10: 0
PAINLEVEL_OUTOF10: 0

## 2024-04-04 NOTE — DISCHARGE SUMMARY
infarction. Stable appearance of the brain with redemonstration of patchy confluent periventricular and subcortical white matter hypodensities. This is nonspecific however most likely reflects sequela from chronic microvascular ischemic disease. Given the extensive periventricular and subcortical white matter hypoattenuation, this can mask developing infarction. The need for short-term CT follow-up versus MRI should be determined on a clinical basis. Moderate cortical atrophy. Remote right parietal lobe cortical infarct. Remote lacunar infarctions of the basal ganglia. Case was discussed with Dr. Smart by Dr. DALLAS Forrester on 4/2/2024 at 0735 hours. Electronically signed by Hilary Forrester DO       CBC:   Recent Labs     04/02/24  0742 04/03/24  0112 04/04/24  0156   WBC 7.4 7.5 8.8   HGB 12.6* 13.1* 13.1*    260 248     BMP:    Recent Labs     04/02/24  0742 04/02/24  0747 04/03/24  0112 04/04/24  0156     --  137 138   K 3.4*  --  3.7 3.7     --  102 104   CO2 25  --  23 21   BUN 20  --  15 17   CREATININE 1.3  --  1.1 1.2   GLUCOSE 99 89 95 90     Hepatic:   Recent Labs     04/02/24  0742 04/03/24  0112 04/04/24  0156   AST 22 21 22   ALT 20 20 18   BILITOT 0.7 0.8 0.6   ALKPHOS 79 84 82     Lipids:   Lab Results   Component Value Date/Time    CHOL 86 04/03/2024 01:12 AM    HDL 32 04/03/2024 01:12 AM    TRIG 72 04/03/2024 01:12 AM     Hemoglobin A1C:   Lab Results   Component Value Date/Time    LABA1C 5.5 04/03/2024 01:12 AM     TSH: No results found for: \"TSH\"  Troponin:   Lab Results   Component Value Date/Time    TROPONINT <0.010 10/03/2022 11:26 PM    TROPONINT <0.010 03/30/2020 04:00 PM     Lactic Acid: No results for input(s): \"LACTA\" in the last 72 hours.  BNP: No results for input(s): \"PROBNP\" in the last 72 hours.  UA:  Lab Results   Component Value Date/Time    NITRU NEGATIVE 03/03/2024 03:49 PM    NITRU Negative 07/10/2023 09:53 AM    COLORU YELLOW 03/03/2024 03:49 PM    PHUR 6.0 07/10/2023

## 2024-04-04 NOTE — PLAN OF CARE
Problem: Chronic Conditions and Co-morbidities  Goal: Patient's chronic conditions and co-morbidity symptoms are monitored and maintained or improved  4/4/2024 1121 by Opal King RN  Outcome: Progressing  4/4/2024 0004 by Shannon Schrader RN  Outcome: Progressing     Problem: Discharge Planning  Goal: Discharge to home or other facility with appropriate resources  4/4/2024 1121 by Opal King RN  Outcome: Progressing  4/4/2024 0004 by Shannon Schrader RN  Outcome: Progressing     Problem: Confusion  Goal: Confusion, delirium, dementia, or psychosis is improved or at baseline  Description: INTERVENTIONS:  1. Assess for possible contributors to thought disturbance, including medications, impaired vision or hearing, underlying metabolic abnormalities, dehydration, psychiatric diagnoses, and notify attending LIP  2. [place] high risk fall precautions, as indicated  3. Provide frequent short contacts to provide reality reorientation, refocusing and direction  4. Decrease environmental stimuli, including noise as appropriate  5. Monitor and intervene to maintain adequate nutrition, hydration, elimination, sleep and activity  6. If unable to ensure safety without constant attention obtain sitter and review sitter guidelines with assigned personnel  7. Initiate Psychosocial CNS and Spiritual Care consult, as indicated  4/4/2024 1121 by Opal King RN  Outcome: Progressing  4/4/2024 0004 by Shannon Schrader RN  Outcome: Progressing     Problem: Risk for Elopement  Goal: Patient will not exit the unit/facility without proper excort  4/4/2024 1121 by Opal King RN  Outcome: Progressing  4/4/2024 0004 by Shannon Schrader RN  Outcome: Progressing     Problem: Safety - Adult  Goal: Free from fall injury  4/4/2024 1121 by Opal King RN  Outcome: Progressing  4/4/2024 0004 by Shannon Schrader RN  Outcome: Progressing

## 2024-04-04 NOTE — PLAN OF CARE
Problem: Chronic Conditions and Co-morbidities  Goal: Patient's chronic conditions and co-morbidity symptoms are monitored and maintained or improved  4/4/2024 1644 by Opal King RN  Outcome: Adequate for Discharge  4/4/2024 1121 by Opal King RN  Outcome: Progressing     Problem: Discharge Planning  Goal: Discharge to home or other facility with appropriate resources  4/4/2024 1644 by Opal King RN  Outcome: Adequate for Discharge  4/4/2024 1121 by Opal King RN  Outcome: Progressing     Problem: Confusion  Goal: Confusion, delirium, dementia, or psychosis is improved or at baseline  Description: INTERVENTIONS:  1. Assess for possible contributors to thought disturbance, including medications, impaired vision or hearing, underlying metabolic abnormalities, dehydration, psychiatric diagnoses, and notify attending LIP  2. [place] high risk fall precautions, as indicated  3. Provide frequent short contacts to provide reality reorientation, refocusing and direction  4. Decrease environmental stimuli, including noise as appropriate  5. Monitor and intervene to maintain adequate nutrition, hydration, elimination, sleep and activity  6. If unable to ensure safety without constant attention obtain sitter and review sitter guidelines with assigned personnel  7. Initiate Psychosocial CNS and Spiritual Care consult, as indicated  4/4/2024 1644 by Opal King RN  Outcome: Adequate for Discharge  4/4/2024 1121 by Opal King RN  Outcome: Progressing     Problem: Risk for Elopement  Goal: Patient will not exit the unit/facility without proper excort  4/4/2024 1644 by Opal King RN  Outcome: Adequate for Discharge  4/4/2024 1121 by Opal King RN  Outcome: Progressing     Problem: Safety - Adult  Goal: Free from fall injury  4/4/2024 1644 by Opal King RN  Outcome: Adequate for Discharge  4/4/2024 1121 by

## 2024-04-04 NOTE — CARE COORDINATION
Pt plans is return to [facility] after medically stable to continue with his rehab.  No precert is needed to return.      CM will need DEBORA to be completed by RN and doctor. If pt is discharged after hours please complete the following.... Call report to [phone]   Fax completed AVS with both DEBORA on the AVS and any written Rx [phone].  Set up transportation with [place] [phone] and call family.

## 2024-04-04 NOTE — DISCHARGE INSTR - DIET

## 2024-04-04 NOTE — PROGRESS NOTES
04/04/24 1501   Encounter Summary   Encounter Overview/Reason  Initial Encounter   Service Provided For: Patient   Referral[facility]Consult From: [facility]   Support System Family members   Last Encounter  04/04/24  (PT calm and copng well, going home soon, no concerns, continue support as needed.)   Complexity of Encounter Low   Begin Time 1456   End Time  1502   Total Time Calculated 6 min   Assessment/Intervention/Outcome   Assessment Calm   Intervention Sustaining Presence/Ministry of presence   Outcome Coping   Plan and Referrals   Plan/Referrals Continue Support (comment)       
4 Eyes Skin Assessment     NAME:  Steve Humphries  YOB: 1945  MEDICAL RECORD NUMBER:  [id number]    The patient is being assessed for  Admission    I agree that at least one RN has performed a thorough Head to Toe Skin Assessment on the patient. ALL assessment sites listed below have been assessed.      Areas assessed by both nurses:    Head, Face, Ears, Shoulders, Back, Chest, Arms, Elbows, Hands, Sacrum. Buttock, Coccyx, Ischium, and Legs. Feet and Heels        Does the Patient have a Wound? No noted wound(s)       Nick Prevention initiated by RN: No  Wound Care Orders initiated by RN: No    Pressure Injury (Stage 3,4, Unstageable, DTI, NWPT, and Complex wounds) if present, place Wound referral order by RN under : No    New Ostomies, if present place, Ostomy referral order under : No     Nurse 1 eSignature: Electronically signed by Opal King RN on 4/2/24 at 6:16 PM EDT    **SHARE this note so that the co-signing nurse can place an eSignature**    Nurse 2 eSignature: Electronically signed by Mallory Guillaume RN on 4/2/24 at 6:18 PM EDT    
Cardiology Progress Note      Today's Plan: We will sign off    Admit Date:  4/2/2024    Consult reason/ Seen today for: JUAN ALBERTO    Subjective and  Overnight Events: Patient verbally abusive.  Wife at bedside redirecting patient.  Wife agrees with plan of care. Conservative management given dementia.    Assessment / Plan / Recommendation:     JUAN ALBERTO to R/O cardiac involvement in CVA. Wife declines at this time.  Acute/subacute CVA of right frontal and parietal lobe superimposed from carotid stenosis.  Carotid stenosis: Right ICA 70% stenosis and left CVA 50% stenosis with  of the right vertebral artery and severe stenosis of left vertebral artery. Not a candidate for intervention.  Interventional neurology and neurology signed off.  Dementia: at baseline   Dyslipidemia: continue statins  DVT prophylaxis if not contraindicated.       -Full consult note to follow per covering Cardiologist: Electronically signed by PAOLA Jarrett CNP on 4/4/2024 at 12:23 PM   
In to see patient and his wife is at bedside and was updated about patient NPO status. Pt wife shared she fed patient 2 serge crackers just prior to me entering room. Updated Ahmet NP in person and called the heart center to alert.     10:55 am   Received call back from the heart center stating patient JUAN ALBERTO with anesthesia has been pushed back to tomorrow at 2 pm.   Went into room to update patient and his wife. Wife questioned patient having procedure. I explained procedure and rationale to both. PS message to Dr. Patel requesting he and/or Ahmet NP speak with patient and wife to answer questions.   
Pt walked out to the center nurses station looking for his wife/Bárbara and his car. Assisted patient back to his room and he was incontinent of a large amount of soft stool which was cleaned up. Pt continues to be impulsive and order received for sitter.   
SLP ALL NOTES  Facility/Department: [address]   CLINICAL BEDSIDE SWALLOW EVALUATION    NAME: Steve Humphries  : 1945  MRN: [id number]    IMPRESSIONS: Steve Humphries was referred for bedside swallow evaluation following admission to [facility]  d/t left extremity weakness concerning for CVA. Per radiologist head CT was negative, MRI revealed \"patchy recent infarcts involving the right frontal and parietal lobes, new from the prior study\". Relevant medical hx includes: CVA, dementia, UTI. No known hx of dysphagia PTA. Previously seen for ST targeting language deficits following CVA.    Pt was evaluated seated upright in bed, alert, confused, and cooperative given cues. Oral mechanism examination was WFL for symmetry, ROM, coordination and strength. Pt accepted PO trials of thin liquids via cup/straw sips, puree consistency and regular solids. Oral stage was WFL with adequate labial seal, oral manipulation/mastication, AP transit and oral clearance. Pharyngeal stage appears WFL with no s/s of aspiration across consistencies.     Recommend continue regular solids and thin liquids. Results/recommendations d/w pt, who  demonstrates limited understanding. No further acute SLP needs ID at this time.     ADMISSION DATE: 2024  ADMITTING DIAGNOSIS: has Enlarged prostate with lower urinary tract symptoms (LUTS); Benign prostatic hyperplasia with urinary frequency; Vertigo; Stage 3b chronic kidney disease ([facility]); Hypercalcemia; Chronic kidney disease, stage 3a ([facility]); Acute CVA (cerebrovascular accident) ([facility]); Ataxia; Right homonymous hemianopsia; Aphasia due to acute stroke ([facility]); Alzheimer's disease, unspecified; CAP (community acquired pneumonia) due to Chlamydia species; Weakness of both lower extremities; and 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  ONSET DATE: this admission    Date of Eval: 4/3/2024  Evaluating Therapist: Adelso Rivas MS, CCC-SLP    Current Diet level:  Current Diet 
Vascular/Interventional Neurology Progress Note  [facility]  Patient Name: Steve Humphries     : 1945      Subjective:     The patient was seen and examined.  Chart reviewed.  Patient is resting in bed.  Appears more alert today.  Appears to neglect left side however able to follow commands on the left with reorientation. MRI of the brain notes recent right frontal and parietal lobe infarcts.  Discussed with the patient and his wife at bedside carotid stenosis and stroke.  The patient's wife reports she does not wish to proceed with any additional procedures or fixing the carotid artery.  She is aware of the the patient is at risk for additional strokes in the future.  His code status has been changed to DNR and reports given his dementia diagnosis and age she does not want to have any additional procedures.  Neurology present for discussion.      Objective:   Scheduled Meds:   aspirin  81 mg Oral Daily    atorvastatin  40 mg Oral Nightly    melatonin  3 mg Oral Nightly    memantine  10 mg Oral BID    therapeutic multivitamin-minerals  1 tablet Oral Daily    sodium chloride flush  5-40 mL IntraVENous 2 times per day    enoxaparin  40 mg SubCUTAneous Daily    divalproex  125 mg Oral BID     Continuous Infusions:   sodium chloride       PRN Meds:.sodium chloride flush, sodium chloride, ondansetron **OR** ondansetron, polyethylene glycol, perflutren lipid microspheres    Vital Signs:  [unfilled]     General: A&O x self disoriented to situation, cooperative  HEENT: NC/AT, EOMI, PERRL, mmm, neck supple  Extremities: no edema, no calf tenderness b/l    Neurological Exam:         Mental Status:  A&O to self, disoriented to situation and year. Appears to neglect left side however able to follow commands on the left with reorientation.  Speech clear, language fluent, repetition and naming intact, follows commands appropriately     Cranial Nerves: Left facial weakness CN II-XII intact     Sensation: 
3   [] 4      Raw Score:  15    [24=0% impaired(CH), 23=1-19%(CI), 20-22=20-39%(CJ), 15-19=40-59%(CK), 10-14=60-79%(CL), 7-9=80-99%(CM), 6=100%(CN)]     Mobility:  Supine to sit: SBA (w/ verbal cues for follow through)   Sit to stand: CGA (from EOB to RW)   Stand to sit: [place] (for safe eccentric control to reclining chair from RW)   Functional Mobility: CGA (Approx 120ft in olivia w/ RW, close chair follow, verbal cues for walker management and pathway negotiation)       Balance:   Sitting balance: Good  Standing balance: Fair+    Pt educated on role of therapy, POC, safety awareness, importance of OOB activity    Treatment:  Therapeutic Activity Training:   Therapeutic activity training was instructed today.  Cues were given for safety, sequence, UE/LE placement, awareness, and balance.    Activities performed today included bed mobility training, sup-sit, sit-stand, functional mobility    Safety: Patient left in chair with chair alarm, call light within reach, RN notified, gait belt used.    Assessment:  Pt is a 78y/o male admitted from home for stroke due to embolism of right carotid artery. Pt at baseline needs assist for ADLs and for functional transfers/mobility w/ RW. Pt currently presents w/ deficits relating to ADLs, IADLs, UE strength/ROM, functional activity tolerance, cognition, safety awareness, and functional mobility. Pt would benefit from continued acute care OT services and upon discharge would benefit from moderate post-acute rehabilitation, anticipate 1-2 hours per day and 5 days per week.    Complexity: Moderate   Prognosis: Fair, limited by cognition   Occupational Therapy Plan  Times Per Week: 3x+  Times Per Day: Once a day  Current Treatment Recommendations: Strengthening, ROM, Balance training, Pain management, Cognitive reorientation, Coordination training, Co-Treatment, Self-Care / ADL, Functional mobility training, Safety education & training, Home management training, Endurance training, 
ethmoid air cells. Trace fluid in the left mastoid air cells. Marrow signal and bones: No marrow signal abnormality. Other findings: No additional findings.     Patchy recent infarcts involving the right frontal and parietal lobes, new from the prior study. No acute intracranial hemorrhage. Moderate atrophy and extensive chronic small vessel ischemic change with numerous chronic infarcts as detailed. Electronically signed by Lalo Ramirez MD    CTA HEAD NECK W CONTRAST    Result Date: 4/2/2024  EXAM: CTA CODE NEURO HEAD AND NECK W CONT INDICATION: Stroke symptoms COMPARISON: CTA of the head and neck dated 3/3/2024 TECHNIQUE: Standard images with 3D reconstructions performed on a separate workstation using a radiologist approved protocol. MIP, MPR, and VRT images were reconstructed in multiple planes under concurrent radiologist supervision and interpreted along with source images. Up-to-date CT equipment and radiation dose reduction techniques were employed. Images were analyzed by Portable [employer] software for gross large vessel occlusion. CONTRAST: 75 mL Isovue-370 intravenous FINDINGS: Aortic arch: Sequential 3 vessel branching pattern. Mild atherosclerotic calcification of the arch. Subclavian arteries: Mildly limited in evaluation secondary to streak artifact from contrast bolus injection, otherwise normal. Common carotid arteries: Mild atherosclerotic calcifications of the carotid bifurcations. Right cervical internal carotid artery: Approximately 70% occlusion of the proximal right cervical internal carotid, similar to prior. Right external carotid artery: No stenosis Left cervical internal carotid artery: Approximately 50% occlusion of the proximal left cervical internal carotid, similar to prior. Left external carotid artery: No stenosis Intracranial internal carotid arteries: Normal Anterior circulation: M1, A1, and their more distal segments are normal. Cervical vertebral arteries: Chronic occlusion of the

## 2024-04-04 NOTE — CARE COORDINATION
Pt is on discharge to return to [facility].  Superior to  pt 4:45PM.  Superior paperwork completed and placed on packet.  Copy of AVS with COCs and discharge summary placed in packet.  RN, Pt, pt's wife and Monse with [facility] all aware of  time.

## 2024-04-04 NOTE — PLAN OF CARE
Problem: Chronic Conditions and Co-morbidities  Goal: Patient's chronic conditions and co-morbidity symptoms are monitored and maintained or improved  4/4/2024 0004 by Shannon Schrader RN  Outcome: Progressing  4/3/2024 1030 by Grover White RN  Outcome: Progressing  4/3/2024 1029 by Grover White RN  Outcome: Progressing     Problem: Discharge Planning  Goal: Discharge to home or other facility with appropriate resources  4/4/2024 0004 by Shannon Schrader RN  Outcome: Progressing  4/3/2024 1030 by Grover White RN  Outcome: Progressing  4/3/2024 1029 by Grover White RN  Outcome: Progressing     Problem: Confusion  Goal: Confusion, delirium, dementia, or psychosis is improved or at baseline  Description: INTERVENTIONS:  1. Assess for possible contributors to thought disturbance, including medications, impaired vision or hearing, underlying metabolic abnormalities, dehydration, psychiatric diagnoses, and notify attending LIP  2. [place] high risk fall precautions, as indicated  3. Provide frequent short contacts to provide reality reorientation, refocusing and direction  4. Decrease environmental stimuli, including noise as appropriate  5. Monitor and intervene to maintain adequate nutrition, hydration, elimination, sleep and activity  6. If unable to ensure safety without constant attention obtain sitter and review sitter guidelines with assigned personnel  7. Initiate Psychosocial CNS and Spiritual Care consult, as indicated  4/4/2024 0004 by Shannon Schrader RN  Outcome: Progressing  4/3/2024 1030 by Grover White RN  Outcome: Progressing  4/3/2024 1029 by Grover White RN  Outcome: Progressing     Problem: Risk for Elopement  Goal: Patient will not exit the unit/facility without proper excort  4/4/2024 0004 by Shannon Schrader RN  Outcome: Progressing  4/3/2024 1030 by Grover White RN  Outcome: Progressing  4/3/2024 1029 by Grover White RN  Outcome: Progressing     Problem:

## 2024-04-11 ENCOUNTER — CARE COORDINATION (OUTPATIENT)
Dept: CARE COORDINATION | Age: 79
End: 2024-04-11

## (undated) DEVICE — JELLY,LUBE,STERILE,FLIP TOP,TUBE,4-OZ: Brand: MEDLINE

## (undated) DEVICE — MARKER SKIN 2 TIP UTIL W/ RULER REG LF

## (undated) DEVICE — SET IRRIG L94IN ID0.281IN W/ 4.5IN DST FLX CONN 2 LD ON OFF

## (undated) DEVICE — SOLUTION IV IRRIG WATER 1000ML POUR BRL 2F7114

## (undated) DEVICE — SYRINGE MED 30ML STD CLR PLAS LUERLOCK TIP N CTRL DISP

## (undated) DEVICE — SOLUTION IRRIG 3000ML STRL H2O USP UROMATIC PLAS CONT

## (undated) DEVICE — GLOVE ORANGE PI 7 1/2   MSG9075

## (undated) DEVICE — GOWN,SIRUS,POLYRNF,BRTHSLV,XLN/XL,20/CS: Brand: MEDLINE

## (undated) DEVICE — Z INACTIVE PER PHARM JELLY LUBRICANT 5 GM BACTERIOSTATIC

## (undated) DEVICE — DRAINBAG,ANTI-REFLUX TOWER,L/F,2000ML,LL: Brand: MEDLINE

## (undated) DEVICE — CATHETER URETH 22FR BLLN 30CC 3 W F SPEC INF CTRL BARDX

## (undated) DEVICE — TOWEL,OR,DSP,ST,BLUE,STD,6/PK,12PK/CS: Brand: MEDLINE

## (undated) DEVICE — GOWN,ECLIPSE,POLYRNF,BRTHSLV,L,30/CS: Brand: MEDLINE

## (undated) DEVICE — TRAY PREP DRY W/ PREM GLV 2 APPL 6 SPNG 2 UNDPD 1 OVERWRAP

## (undated) DEVICE — ELECTRODE LOOP 24FR R ANG MPLR CUT

## (undated) DEVICE — SYRINGE CATH TIP 50ML

## (undated) DEVICE — CABLE ENDOSCP L10FT ACT DISP

## (undated) DEVICE — PACK,CYSTOSCOPY,PK VI: Brand: MEDLINE

## (undated) DEVICE — GLOVE SURG SZ 65 CRM LTX FREE POLYISOPRENE POLYMER BEAD ANTI

## (undated) DEVICE — ELECTRODE ES AD CRDLSS PT RET REM POLYHESIVE

## (undated) DEVICE — CONTAINER,SPECIMEN,OR STERILE,4OZ: Brand: MEDLINE